# Patient Record
Sex: FEMALE | Race: WHITE | Employment: UNEMPLOYED | URBAN - METROPOLITAN AREA
[De-identification: names, ages, dates, MRNs, and addresses within clinical notes are randomized per-mention and may not be internally consistent; named-entity substitution may affect disease eponyms.]

---

## 2021-10-05 ENCOUNTER — HOSPITAL ENCOUNTER (INPATIENT)
Age: 51
LOS: 4 days | Discharge: HOME | DRG: 190 | End: 2021-10-09
Attending: EMERGENCY MEDICINE | Admitting: INTERNAL MEDICINE
Payer: MEDICARE

## 2021-10-05 ENCOUNTER — APPOINTMENT (OUTPATIENT)
Dept: CT IMAGING | Age: 51
DRG: 190 | End: 2021-10-05
Payer: MEDICARE

## 2021-10-05 DIAGNOSIS — R07.9 CHEST PAIN, UNSPECIFIED TYPE: Primary | ICD-10-CM

## 2021-10-05 PROBLEM — F41.8 DEPRESSION WITH ANXIETY: Status: ACTIVE | Noted: 2021-10-05

## 2021-10-05 PROBLEM — J44.1 COPD EXACERBATION (HCC): Status: ACTIVE | Noted: 2021-10-05

## 2021-10-05 PROBLEM — J96.21 ACUTE AND CHRONIC RESPIRATORY FAILURE WITH HYPOXIA (HCC): Status: ACTIVE | Noted: 2021-10-05

## 2021-10-05 PROBLEM — Z20.822 SUSPECTED COVID-19 VIRUS INFECTION: Status: ACTIVE | Noted: 2021-10-05

## 2021-10-05 LAB
A/G RATIO: 1.2 (ref 1.1–2.2)
ALBUMIN SERPL-MCNC: 3.9 G/DL (ref 3.4–5)
ALP BLD-CCNC: 91 U/L (ref 40–129)
ALT SERPL-CCNC: 20 U/L (ref 10–40)
ANION GAP SERPL CALCULATED.3IONS-SCNC: 12 MMOL/L (ref 3–16)
AST SERPL-CCNC: 19 U/L (ref 15–37)
BASOPHILS ABSOLUTE: 0.1 K/UL (ref 0–0.2)
BASOPHILS RELATIVE PERCENT: 0.8 %
BILIRUB SERPL-MCNC: 0.3 MG/DL (ref 0–1)
BUN BLDV-MCNC: 7 MG/DL (ref 7–20)
CALCIUM SERPL-MCNC: 9.3 MG/DL (ref 8.3–10.6)
CHLORIDE BLD-SCNC: 103 MMOL/L (ref 99–110)
CO2: 24 MMOL/L (ref 21–32)
CREAT SERPL-MCNC: <0.5 MG/DL (ref 0.6–1.1)
EKG ATRIAL RATE: 88 BPM
EKG DIAGNOSIS: NORMAL
EKG P AXIS: 61 DEGREES
EKG P-R INTERVAL: 164 MS
EKG Q-T INTERVAL: 342 MS
EKG QRS DURATION: 80 MS
EKG QTC CALCULATION (BAZETT): 413 MS
EKG R AXIS: 110 DEGREES
EKG T AXIS: 64 DEGREES
EKG VENTRICULAR RATE: 88 BPM
EOSINOPHILS ABSOLUTE: 0.2 K/UL (ref 0–0.6)
EOSINOPHILS RELATIVE PERCENT: 2 %
GFR AFRICAN AMERICAN: >60
GFR NON-AFRICAN AMERICAN: >60
GLOBULIN: 3.3 G/DL
GLUCOSE BLD-MCNC: 111 MG/DL (ref 70–99)
HCG QUALITATIVE: NEGATIVE
HCT VFR BLD CALC: 46.6 % (ref 36–48)
HEMOGLOBIN: 16.1 G/DL (ref 12–16)
LYMPHOCYTES ABSOLUTE: 2.8 K/UL (ref 1–5.1)
LYMPHOCYTES RELATIVE PERCENT: 23.4 %
MCH RBC QN AUTO: 31.1 PG (ref 26–34)
MCHC RBC AUTO-ENTMCNC: 34.5 G/DL (ref 31–36)
MCV RBC AUTO: 90.2 FL (ref 80–100)
MONOCYTES ABSOLUTE: 0.8 K/UL (ref 0–1.3)
MONOCYTES RELATIVE PERCENT: 6.3 %
NEUTROPHILS ABSOLUTE: 8 K/UL (ref 1.7–7.7)
NEUTROPHILS RELATIVE PERCENT: 67.5 %
PDW BLD-RTO: 14 % (ref 12.4–15.4)
PLATELET # BLD: 286 K/UL (ref 135–450)
PMV BLD AUTO: 7.3 FL (ref 5–10.5)
POTASSIUM REFLEX MAGNESIUM: 4.9 MMOL/L (ref 3.5–5.1)
PRO-BNP: 116 PG/ML (ref 0–124)
RBC # BLD: 5.16 M/UL (ref 4–5.2)
SODIUM BLD-SCNC: 139 MMOL/L (ref 136–145)
TOTAL PROTEIN: 7.2 G/DL (ref 6.4–8.2)
TROPONIN: <0.01 NG/ML
WBC # BLD: 11.9 K/UL (ref 4–11)

## 2021-10-05 PROCEDURE — 6370000000 HC RX 637 (ALT 250 FOR IP): Performed by: EMERGENCY MEDICINE

## 2021-10-05 PROCEDURE — U0005 INFEC AGEN DETEC AMPLI PROBE: HCPCS

## 2021-10-05 PROCEDURE — U0003 INFECTIOUS AGENT DETECTION BY NUCLEIC ACID (DNA OR RNA); SEVERE ACUTE RESPIRATORY SYNDROME CORONAVIRUS 2 (SARS-COV-2) (CORONAVIRUS DISEASE [COVID-19]), AMPLIFIED PROBE TECHNIQUE, MAKING USE OF HIGH THROUGHPUT TECHNOLOGIES AS DESCRIBED BY CMS-2020-01-R: HCPCS

## 2021-10-05 PROCEDURE — 71260 CT THORAX DX C+: CPT

## 2021-10-05 PROCEDURE — 85025 COMPLETE CBC W/AUTO DIFF WBC: CPT

## 2021-10-05 PROCEDURE — 83880 ASSAY OF NATRIURETIC PEPTIDE: CPT

## 2021-10-05 PROCEDURE — 80053 COMPREHEN METABOLIC PANEL: CPT

## 2021-10-05 PROCEDURE — 93005 ELECTROCARDIOGRAM TRACING: CPT | Performed by: EMERGENCY MEDICINE

## 2021-10-05 PROCEDURE — 84484 ASSAY OF TROPONIN QUANT: CPT

## 2021-10-05 PROCEDURE — 93010 ELECTROCARDIOGRAM REPORT: CPT | Performed by: INTERNAL MEDICINE

## 2021-10-05 PROCEDURE — 84703 CHORIONIC GONADOTROPIN ASSAY: CPT

## 2021-10-05 PROCEDURE — 6370000000 HC RX 637 (ALT 250 FOR IP): Performed by: INTERNAL MEDICINE

## 2021-10-05 PROCEDURE — 6360000004 HC RX CONTRAST MEDICATION: Performed by: EMERGENCY MEDICINE

## 2021-10-05 PROCEDURE — 6360000002 HC RX W HCPCS: Performed by: INTERNAL MEDICINE

## 2021-10-05 PROCEDURE — 94761 N-INVAS EAR/PLS OXIMETRY MLT: CPT

## 2021-10-05 PROCEDURE — 99284 EMERGENCY DEPT VISIT MOD MDM: CPT

## 2021-10-05 PROCEDURE — 1200000000 HC SEMI PRIVATE

## 2021-10-05 PROCEDURE — 2700000000 HC OXYGEN THERAPY PER DAY

## 2021-10-05 PROCEDURE — 2580000003 HC RX 258: Performed by: INTERNAL MEDICINE

## 2021-10-05 PROCEDURE — 36415 COLL VENOUS BLD VENIPUNCTURE: CPT

## 2021-10-05 PROCEDURE — 94640 AIRWAY INHALATION TREATMENT: CPT

## 2021-10-05 RX ORDER — IPRATROPIUM BROMIDE AND ALBUTEROL SULFATE 2.5; .5 MG/3ML; MG/3ML
1 SOLUTION RESPIRATORY (INHALATION)
Status: DISCONTINUED | OUTPATIENT
Start: 2021-10-06 | End: 2021-10-05

## 2021-10-05 RX ORDER — PREDNISONE 20 MG/1
40 TABLET ORAL DAILY
Status: DISCONTINUED | OUTPATIENT
Start: 2021-10-08 | End: 2021-10-08

## 2021-10-05 RX ORDER — SODIUM CHLORIDE 0.9 % (FLUSH) 0.9 %
5-40 SYRINGE (ML) INJECTION PRN
Status: DISCONTINUED | OUTPATIENT
Start: 2021-10-05 | End: 2021-10-09 | Stop reason: HOSPADM

## 2021-10-05 RX ORDER — QUETIAPINE FUMARATE 50 MG/1
50 TABLET, EXTENDED RELEASE ORAL NIGHTLY
COMMUNITY

## 2021-10-05 RX ORDER — SODIUM CHLORIDE 9 MG/ML
25 INJECTION, SOLUTION INTRAVENOUS PRN
Status: DISCONTINUED | OUTPATIENT
Start: 2021-10-05 | End: 2021-10-09 | Stop reason: HOSPADM

## 2021-10-05 RX ORDER — BUSPIRONE HYDROCHLORIDE 15 MG/1
15 TABLET ORAL 2 TIMES DAILY
COMMUNITY

## 2021-10-05 RX ORDER — GABAPENTIN 600 MG/1
600 TABLET ORAL 2 TIMES DAILY
COMMUNITY

## 2021-10-05 RX ORDER — METHYLPREDNISOLONE SODIUM SUCCINATE 40 MG/ML
40 INJECTION, POWDER, LYOPHILIZED, FOR SOLUTION INTRAMUSCULAR; INTRAVENOUS EVERY 6 HOURS
Status: COMPLETED | OUTPATIENT
Start: 2021-10-05 | End: 2021-10-07

## 2021-10-05 RX ORDER — DOXYCYCLINE HYCLATE 100 MG
100 TABLET ORAL EVERY 12 HOURS
Status: DISCONTINUED | OUTPATIENT
Start: 2021-10-05 | End: 2021-10-09 | Stop reason: HOSPADM

## 2021-10-05 RX ORDER — ALBUTEROL SULFATE 90 UG/1
2 AEROSOL, METERED RESPIRATORY (INHALATION)
Status: DISCONTINUED | OUTPATIENT
Start: 2021-10-06 | End: 2021-10-08

## 2021-10-05 RX ORDER — QUETIAPINE FUMARATE 50 MG/1
50 TABLET, EXTENDED RELEASE ORAL NIGHTLY
Status: DISCONTINUED | OUTPATIENT
Start: 2021-10-05 | End: 2021-10-09 | Stop reason: HOSPADM

## 2021-10-05 RX ORDER — ACETAMINOPHEN 325 MG/1
650 TABLET ORAL EVERY 6 HOURS PRN
Status: DISCONTINUED | OUTPATIENT
Start: 2021-10-05 | End: 2021-10-09 | Stop reason: HOSPADM

## 2021-10-05 RX ORDER — POLYETHYLENE GLYCOL 3350 17 G/17G
17 POWDER, FOR SOLUTION ORAL DAILY PRN
Status: DISCONTINUED | OUTPATIENT
Start: 2021-10-05 | End: 2021-10-09 | Stop reason: HOSPADM

## 2021-10-05 RX ORDER — NITROGLYCERIN 0.4 MG/1
0.4 TABLET SUBLINGUAL ONCE
Status: COMPLETED | OUTPATIENT
Start: 2021-10-05 | End: 2021-10-05

## 2021-10-05 RX ORDER — SODIUM CHLORIDE 0.9 % (FLUSH) 0.9 %
5-40 SYRINGE (ML) INJECTION EVERY 12 HOURS SCHEDULED
Status: DISCONTINUED | OUTPATIENT
Start: 2021-10-05 | End: 2021-10-09 | Stop reason: HOSPADM

## 2021-10-05 RX ORDER — ACETAMINOPHEN 650 MG/1
650 SUPPOSITORY RECTAL EVERY 6 HOURS PRN
Status: DISCONTINUED | OUTPATIENT
Start: 2021-10-05 | End: 2021-10-09 | Stop reason: HOSPADM

## 2021-10-05 RX ORDER — GABAPENTIN 300 MG/1
600 CAPSULE ORAL 2 TIMES DAILY
Status: DISCONTINUED | OUTPATIENT
Start: 2021-10-05 | End: 2021-10-09 | Stop reason: HOSPADM

## 2021-10-05 RX ORDER — ONDANSETRON 2 MG/ML
4 INJECTION INTRAMUSCULAR; INTRAVENOUS EVERY 6 HOURS PRN
Status: DISCONTINUED | OUTPATIENT
Start: 2021-10-05 | End: 2021-10-09 | Stop reason: HOSPADM

## 2021-10-05 RX ORDER — BUSPIRONE HYDROCHLORIDE 5 MG/1
15 TABLET ORAL 2 TIMES DAILY
Status: DISCONTINUED | OUTPATIENT
Start: 2021-10-06 | End: 2021-10-09 | Stop reason: HOSPADM

## 2021-10-05 RX ORDER — IPRATROPIUM BROMIDE AND ALBUTEROL SULFATE 2.5; .5 MG/3ML; MG/3ML
1 SOLUTION RESPIRATORY (INHALATION) ONCE
Status: COMPLETED | OUTPATIENT
Start: 2021-10-05 | End: 2021-10-05

## 2021-10-05 RX ORDER — ONDANSETRON 4 MG/1
4 TABLET, ORALLY DISINTEGRATING ORAL EVERY 8 HOURS PRN
Status: DISCONTINUED | OUTPATIENT
Start: 2021-10-05 | End: 2021-10-09 | Stop reason: HOSPADM

## 2021-10-05 RX ADMIN — IPRATROPIUM BROMIDE AND ALBUTEROL SULFATE 1 AMPULE: .5; 3 SOLUTION RESPIRATORY (INHALATION) at 15:21

## 2021-10-05 RX ADMIN — GABAPENTIN 600 MG: 300 CAPSULE ORAL at 22:11

## 2021-10-05 RX ADMIN — NITROGLYCERIN 0.4 MG: 0.4 TABLET SUBLINGUAL at 15:03

## 2021-10-05 RX ADMIN — ENOXAPARIN SODIUM 40 MG: 40 INJECTION SUBCUTANEOUS at 22:11

## 2021-10-05 RX ADMIN — IOPAMIDOL 75 ML: 755 INJECTION, SOLUTION INTRAVENOUS at 15:43

## 2021-10-05 RX ADMIN — SODIUM CHLORIDE, PRESERVATIVE FREE 10 ML: 5 INJECTION INTRAVENOUS at 22:11

## 2021-10-05 RX ADMIN — METHYLPREDNISOLONE SODIUM SUCCINATE 40 MG: 40 INJECTION, POWDER, FOR SOLUTION INTRAMUSCULAR; INTRAVENOUS at 22:11

## 2021-10-05 RX ADMIN — DOXYCYCLINE HYCLATE 100 MG: 100 TABLET, COATED ORAL at 22:11

## 2021-10-05 ASSESSMENT — PAIN SCALES - GENERAL: PAINLEVEL_OUTOF10: 0

## 2021-10-05 NOTE — H&P
Hospital Medicine History & Physical      PCP: [address]    Date of Admission: 10/5/2021    Date of Service: Pt seen/examined on 10/5/2021  and Admitted to Inpatient with expected LOS greater than two midnights due to medical therapy. Chief Complaint:    Chief Complaint   Patient presents with    Shortness of Breath     Came in by  EMS from home with c/o SOB and tightness to chest with 4 L O2. Hx COPD. History Of Present Illness: The patient is a 52 y.o. female with history of COPD, chronic respiratory failure on home oxygen, bipolar disorder, anxiety/depression who presented with few day history of chest tightness with associated dry cough, no fevers or chills. No chest pains. No diaphoresis. No diarrhea. No nausea or vomiting. She reports 3 pillow orthopnea but no PND. No ankle swelling. Chest CT angiogram negative for PE but noted for groundglass opacities questionable for viral pneumonitis. Patient vaccinated for COVID-19  Of note at home she is on 2 L O2 and had to be increased to 4 L nasal cannula to maintain sats greater than 90%      Past Medical History:        Diagnosis Date    Anxiety     Bipolar 1 disorder ([address]     Depression     Pulmonary emboli ([facility]        Past Surgical History:        Procedure Laterality Date    CHOLECYSTECTOMY      KNEE ARTHROSCOPY Right 6/24/14    WITH SYNOVECTOMY    KNEE SURGERY      right    MOUTH SURGERY      TUBAL LIGATION         Medications Prior to Admission:    Prior to Admission medications    Medication Sig Start Date End Date Taking? Authorizing Provider   gabapentin (NEURONTIN) 600 MG tablet Take 600 mg by mouth 2 times daily. Historical Provider, MD   busPIRone (BUSPAR) 15 MG tablet Take 15 mg by mouth 2 times daily    Historical Provider, MD   QUEtiapine (SEROQUEL XR) 50 MG extended release tablet Take 50 mg by mouth nightly    Historical Provider, MD       Allergies:  Aspirin    Social History:   The patient currently lives with family    TOBACCO:   reports that she has been smoking. She has a 12.50 pack-year smoking history. She has never used smokeless tobacco.  ETOH:   reports no history of alcohol use. Family History:  Reviewed in detail and negative for DM, Early CAD, Cancer, CVA. Positive as follows:        Problem Relation Age of Onset    Asthma Other     High Blood Pressure Other     Stroke Other        REVIEW OF SYSTEMS:   Positive for shortness of breath, dry cough, increased FiO2 use and as noted in the HPI. All other systems reviewed and negative. PHYSICAL EXAM:    /60   Pulse 79   Temp 98.8 °F (37.1 °C) (Oral)   Resp 23   Ht 5' 4\" (1.626 m)   Wt 256 lb (116.1 kg)   SpO2 91%   BMI 43.94 kg/m²     General appearance: No apparent distress appears stated age and cooperative. HEENT Normal cephalic, atraumatic without obvious deformity. Pupils equal, round, and reactive to light. Extra ocular muscles intact. Conjunctivae/corneas clear. Neck: Supple, No jugular venous distention/bruits. Lungs: Clear to auscultation, bilaterally without Rales/Wheezes/Rhonchi with good respiratory effort. Heart: Regular rate and rhythm with Normal S1/S2 without murmurs, rubs or gallops  Abdomen: Soft, non-tender or non-distended without rigidity or guarding and positive bowel sounds   Extremities: No clubbing, cyanosis, or edema bilaterally. Skin: Skin color, texture, turgor normal.  No rashes or lesions. Neurologic: Alert and oriented X 3, neurovascularly intact with sensory/motor intact upper extremities/lower extremities, bilaterally. Cranial nerves: II-XII intact, grossly non-focal.  Mental status: Alert, oriented, thought content appropriate.         CBC   Recent Labs     10/05/21  1415   WBC 11.9*   HGB 16.1*   HCT 46.6         RENAL  Recent Labs     10/05/21  1415      K 4.9      CO2 24   BUN 7   CREATININE <0.5*     LFT'S  Recent Labs     10/05/21  1415   AST 19 ALT 20   BILITOT 0.3   ALKPHOS 91     COAG  No results for input(s): INR in the last 72 hours. CARDIAC ENZYMES  Recent Labs     10/05/21  1415   TROPONINI <0.01       Active Hospital Problems    Diagnosis Date Noted    COPD exacerbation ([address] [J44.1] 10/05/2021    Acute and chronic respiratory failure with hypoxia [facility]) [J96.21] 10/05/2021    Depression with anxiety [F41.8] 10/05/2021         ASSESSMENT/PLAN:  52 y.o. female with history of COPD, chronic respiratory failure on home oxygen, bipolar disorder, anxiety/depression who presented with few day history of chest tightness with associated dry cough concerning for acute exacerbation of COPD and acute on chronic respiratory failure with hypoxia and hypercapnia, and suspected COVID-19 pneumonia    Plan:  -Continue on breathing therapy with scheduled and PRN bronchodilators and  cardiopulmonary protocol  -Systemic IV steroids  -Antibiotic coverage  - Oxygen supplementation with target saturations greater than 90% and wean as tolerated  -If not improving, will consider pulmonology consult  -Follow-up COVID-19 screening test  -Isolation until Covid screening test back    DVT Prophylaxis:  subcut enoxaparin  Diet: General diet  Code Status: Full code         Emmanuelle Gonzalez MD    Thank you [address] for the opportunity to be involved in this patient's care. If you have any questions or concerns please feel free to contact me at [phone].

## 2021-10-05 NOTE — ED PROVIDER NOTES
[address] PROVIDER NOTE    Patient Identification  Pt Name: Sally Mendoza  MRN: [id number]  Selvingfmeagan 1970  Date of evaluation: 10/5/2021  Provider: Bossman Rudolph MD  PCP: [facility] [address]     Chief Complaint  Shortness of Breath (Came in by  EMS from home with c/o SOB and tightness to chest with 4 L O2. Hx COPD. )      HPI  History provided by patient   This is a 52 y.o. female who presents to the ED for shortness of breath. Associated with chest tightness. Nonradiating. Unsure what is causing this. Does have cough for the past week or so. Nonproductive. No fevers or chills. No body aches. Tried using her inhaler for history of COPD without improvement. Does have history of nocturnal oxygen use but has not had oxygen available for the past few months. No recent long plane ride or car ride. Has been off of Coumadin for years. Has history of unprovoked pulmonary embolism. Last stress testing was roughly 3 years ago at [facility]. Did have history of coronary artery disease. [employer]  10 systems reviewed, pertinent positives/negatives per HPI otherwise noted to be negative. I have reviewed the following nursing documentation:  Allergies: Aspirin    Past medical history:   Past Medical History:   Diagnosis Date    Anxiety     Bipolar 1 disorder ([address]     Depression     Pulmonary emboli (HCC)      Past surgical history:   Past Surgical History:   Procedure Laterality Date    CHOLECYSTECTOMY      KNEE ARTHROSCOPY Right 6/24/14    WITH SYNOVECTOMY    KNEE SURGERY      right    MOUTH SURGERY      TUBAL LIGATION         Home medications:   Previous Medications    ALPRAZOLAM (XANAX) 1 MG TABLET    Take 1 mg by mouth three times daily. BUSPIRONE (BUSPAR) 15 MG TABLET    Take 15 mg by mouth 2 times daily    GABAPENTIN (NEURONTIN) 600 MG TABLET    Take 600 mg by mouth 3 times daily.     QUETIAPINE (SEROQUEL XR) 50 MG EXTENDED RELEASE TABLET Take 50 mg by mouth nightly    VENLAFAXINE (EFFEXOR XR) 75 MG XR CAPSULE    Take 75 mg by mouth daily. WARFARIN SODIUM (COUMADIN PO)    Take 8 mg by mouth daily. Social history:  reports that she has been smoking. She has a 12.50 pack-year smoking history. She has never used smokeless tobacco. She reports that she does not drink alcohol and does not use drugs. Family history:    Family History   Problem Relation Age of Onset    Asthma Other     High Blood Pressure Other     Stroke Other          Exam  ED Triage Vitals [10/05/21 1315]   BP Temp Temp Source Pulse Resp SpO2 Height Weight   135/74 98.8 °F (37.1 °C) Oral 92 20 90 % 5' 4\" (1.626 m) 256 lb (116.1 kg)     Nursing note and vitals reviewed. Constitutional: In no acute distress  HENT:      Head: Normocephalic      Ears: External ears normal.      Nose: Nose normal.     Mouth: Membrane mucosa moist   Eyes: No discharge. Neck: Supple. Trachea midline. Cardiovascular: Regular rate. Warm extremities  Pulmonary/Chest: Effort mildly increased. Bilateral wheezes, mild. No respiratory distress. Abdominal: Soft. No distension. Nontender  : Deferred  Rectal: Deferred   Musculoskeletal: Moves all extremities. No gross deformity. Neurological: Alert and oriented. Face symmetric. Speech is clear. Skin: Warm and dry. Psychiatric: Normal mood and affect. Behavior is normal.    Procedures      EKG  The Ekg interpreted by me in the absence of a cardiologist shows. Normal sinus rhythm. No specific ST changes appreciated. HR 88  No significant change from prior EKG dated 6/14        Radiology  CT CHEST PULMONARY EMBOLISM W CONTRAST    (Results Pending)       Labs  No results found for this visit on 10/05/21. Screenings           MDM and ED Course  This is a 52 y.o. female who presents to the ED for shortness of breath, chest tightness.   ACS in differential, but less likely since in 2018 had cardiac catheterization without obstructive

## 2021-10-05 NOTE — ED NOTES
Pharmacy Medication History Note      List of current medications patient is taking is complete. Source of information: [facility]    Changes made to medication list:  Medications flagged for removal (include reason, ex. noncompliance):  none    Medications removed (include reason, ex. therapy complete or physician discontinued):  See list below- therapy completed/non-compliance    Medications added/doses adjusted:  none    Other notes (ex. Recent course of antibiotics, Coumadin dosing):  Denies use of other OTC or herbal medications. Last dose times updated. Ellin Bamberger, PharmD  ED Pharmacist [id number]  10/5/2021    No current facility-administered medications on file prior to encounter. Current Outpatient Medications on File Prior to Encounter   Medication Sig Dispense Refill    gabapentin (NEURONTIN) 600 MG tablet Take 600 mg by mouth 2 times daily. busPIRone (BUSPAR) 15 MG tablet Take 15 mg by mouth 2 times daily      QUEtiapine (SEROQUEL XR) 50 MG extended release tablet Take 50 mg by mouth nightly      [DISCONTINUED] Warfarin Sodium (COUMADIN PO) Take 8 mg by mouth daily. [DISCONTINUED] omeprazole (PRILOSEC) 20 MG capsule Take 20 mg by mouth 2 times daily. [DISCONTINUED] ALPRAZolam (XANAX) 1 MG tablet Take 1 mg by mouth three times daily. [DISCONTINUED] oxyCODONE-acetaminophen (PERCOCET) 7.5-325 MG per tablet Take 1 tablet by mouth every 4 hours as needed for Pain. [DISCONTINUED] pregabalin (LYRICA) 150 MG capsule Take 150 mg by mouth 2 times daily. [DISCONTINUED] lamoTRIgine (LAMICTAL) 25 MG tablet Take 25 mg by mouth daily. [DISCONTINUED] topiramate (TOPAMAX) 50 MG tablet Take 50 mg by mouth 2 times daily. [DISCONTINUED] venlafaxine (EFFEXOR XR) 75 MG XR capsule Take 75 mg by mouth daily. [DISCONTINUED] Loratadine (CLARITIN) 10 MG CAPS Take  by mouth.       [DISCONTINUED] naproxen (NAPROSYN) 500 MG tablet Take 1 tablet by mouth 2 times daily (with meals). 60 tablet 3    [DISCONTINUED] risperiDONE (RISPERDAL M-TABS) 0.5 MG disintegrating tablet Take 0.5 mg by mouth 2 times daily.

## 2021-10-06 LAB
A/G RATIO: 1.1 (ref 1.1–2.2)
ALBUMIN SERPL-MCNC: 4.1 G/DL (ref 3.4–5)
ALP BLD-CCNC: 113 U/L (ref 40–129)
ALT SERPL-CCNC: 22 U/L (ref 10–40)
ANION GAP SERPL CALCULATED.3IONS-SCNC: 13 MMOL/L (ref 3–16)
AST SERPL-CCNC: 17 U/L (ref 15–37)
BILIRUB SERPL-MCNC: 0.4 MG/DL (ref 0–1)
BUN BLDV-MCNC: 11 MG/DL (ref 7–20)
CALCIUM SERPL-MCNC: 9.3 MG/DL (ref 8.3–10.6)
CHLORIDE BLD-SCNC: 101 MMOL/L (ref 99–110)
CO2: 23 MMOL/L (ref 21–32)
CREAT SERPL-MCNC: 0.5 MG/DL (ref 0.6–1.1)
GFR AFRICAN AMERICAN: >60
GFR NON-AFRICAN AMERICAN: >60
GLOBULIN: 3.7 G/DL
GLUCOSE BLD-MCNC: 184 MG/DL (ref 70–99)
HCT VFR BLD CALC: 50.6 % (ref 36–48)
HEMOGLOBIN: 17 G/DL (ref 12–16)
MCH RBC QN AUTO: 30.5 PG (ref 26–34)
MCHC RBC AUTO-ENTMCNC: 33.5 G/DL (ref 31–36)
MCV RBC AUTO: 91.1 FL (ref 80–100)
ORGANISM: ABNORMAL
PDW BLD-RTO: 14.4 % (ref 12.4–15.4)
PLATELET # BLD: 309 K/UL (ref 135–450)
PMV BLD AUTO: 7.9 FL (ref 5–10.5)
POTASSIUM REFLEX MAGNESIUM: 5.3 MMOL/L (ref 3.5–5.1)
PROCALCITONIN: 0.05 NG/ML (ref 0–0.15)
RBC # BLD: 5.56 M/UL (ref 4–5.2)
REPORT: NORMAL
RESPIRATORY PANEL PCR: ABNORMAL
SARS-COV-2: NOT DETECTED
SODIUM BLD-SCNC: 137 MMOL/L (ref 136–145)
TOTAL PROTEIN: 7.8 G/DL (ref 6.4–8.2)
WBC # BLD: 12.6 K/UL (ref 4–11)

## 2021-10-06 PROCEDURE — 85027 COMPLETE CBC AUTOMATED: CPT

## 2021-10-06 PROCEDURE — 6360000002 HC RX W HCPCS: Performed by: INTERNAL MEDICINE

## 2021-10-06 PROCEDURE — 2580000003 HC RX 258: Performed by: INTERNAL MEDICINE

## 2021-10-06 PROCEDURE — 94640 AIRWAY INHALATION TREATMENT: CPT

## 2021-10-06 PROCEDURE — 6370000000 HC RX 637 (ALT 250 FOR IP): Performed by: INTERNAL MEDICINE

## 2021-10-06 PROCEDURE — 0202U NFCT DS 22 TRGT SARS-COV-2: CPT

## 2021-10-06 PROCEDURE — 94761 N-INVAS EAR/PLS OXIMETRY MLT: CPT

## 2021-10-06 PROCEDURE — 99407 BEHAV CHNG SMOKING > 10 MIN: CPT | Performed by: INTERNAL MEDICINE

## 2021-10-06 PROCEDURE — 84145 PROCALCITONIN (PCT): CPT

## 2021-10-06 PROCEDURE — 2700000000 HC OXYGEN THERAPY PER DAY

## 2021-10-06 PROCEDURE — 99223 1ST HOSP IP/OBS HIGH 75: CPT | Performed by: INTERNAL MEDICINE

## 2021-10-06 PROCEDURE — 80053 COMPREHEN METABOLIC PANEL: CPT

## 2021-10-06 PROCEDURE — 1200000000 HC SEMI PRIVATE

## 2021-10-06 PROCEDURE — 36415 COLL VENOUS BLD VENIPUNCTURE: CPT

## 2021-10-06 RX ADMIN — Medication 2 PUFF: at 13:31

## 2021-10-06 RX ADMIN — Medication 2 PUFF: at 16:37

## 2021-10-06 RX ADMIN — Medication 2 PUFF: at 13:30

## 2021-10-06 RX ADMIN — QUETIAPINE FUMARATE 50 MG: 50 TABLET, EXTENDED RELEASE ORAL at 21:19

## 2021-10-06 RX ADMIN — BUSPIRONE HYDROCHLORIDE 15 MG: 5 TABLET ORAL at 16:16

## 2021-10-06 RX ADMIN — Medication 2 PUFF: at 09:14

## 2021-10-06 RX ADMIN — GABAPENTIN 600 MG: 300 CAPSULE ORAL at 21:13

## 2021-10-06 RX ADMIN — METHYLPREDNISOLONE SODIUM SUCCINATE 40 MG: 40 INJECTION, POWDER, FOR SOLUTION INTRAMUSCULAR; INTRAVENOUS at 04:17

## 2021-10-06 RX ADMIN — DOXYCYCLINE HYCLATE 100 MG: 100 TABLET, COATED ORAL at 09:53

## 2021-10-06 RX ADMIN — QUETIAPINE FUMARATE 50 MG: 50 TABLET, EXTENDED RELEASE ORAL at 00:01

## 2021-10-06 RX ADMIN — SODIUM CHLORIDE, PRESERVATIVE FREE 10 ML: 5 INJECTION INTRAVENOUS at 21:14

## 2021-10-06 RX ADMIN — METHYLPREDNISOLONE SODIUM SUCCINATE 40 MG: 40 INJECTION, POWDER, FOR SOLUTION INTRAMUSCULAR; INTRAVENOUS at 21:13

## 2021-10-06 RX ADMIN — SODIUM CHLORIDE, PRESERVATIVE FREE 10 ML: 5 INJECTION INTRAVENOUS at 09:56

## 2021-10-06 RX ADMIN — ENOXAPARIN SODIUM 40 MG: 40 INJECTION SUBCUTANEOUS at 09:52

## 2021-10-06 RX ADMIN — METHYLPREDNISOLONE SODIUM SUCCINATE 40 MG: 40 INJECTION, POWDER, FOR SOLUTION INTRAMUSCULAR; INTRAVENOUS at 16:16

## 2021-10-06 RX ADMIN — GABAPENTIN 600 MG: 300 CAPSULE ORAL at 09:53

## 2021-10-06 RX ADMIN — ENOXAPARIN SODIUM 40 MG: 40 INJECTION SUBCUTANEOUS at 21:13

## 2021-10-06 RX ADMIN — METHYLPREDNISOLONE SODIUM SUCCINATE 40 MG: 40 INJECTION, POWDER, FOR SOLUTION INTRAMUSCULAR; INTRAVENOUS at 09:52

## 2021-10-06 RX ADMIN — BUSPIRONE HYDROCHLORIDE 15 MG: 5 TABLET ORAL at 09:52

## 2021-10-06 RX ADMIN — ACETAMINOPHEN 650 MG: 325 TABLET ORAL at 10:45

## 2021-10-06 RX ADMIN — DOXYCYCLINE HYCLATE 100 MG: 100 TABLET, COATED ORAL at 21:13

## 2021-10-06 ASSESSMENT — PAIN DESCRIPTION - PAIN TYPE: TYPE: ACUTE PAIN

## 2021-10-06 ASSESSMENT — PAIN DESCRIPTION - DESCRIPTORS: DESCRIPTORS: ACHING

## 2021-10-06 ASSESSMENT — PAIN SCALES - GENERAL
PAINLEVEL_OUTOF10: 0
PAINLEVEL_OUTOF10: 0
PAINLEVEL_OUTOF10: 7
PAINLEVEL_OUTOF10: 0

## 2021-10-06 ASSESSMENT — PAIN DESCRIPTION - ORIENTATION: ORIENTATION: LOWER

## 2021-10-06 ASSESSMENT — PAIN DESCRIPTION - LOCATION: LOCATION: BACK

## 2021-10-06 NOTE — PROGRESS NOTES
Patient arrived to the unit in stable condition. SpO2 90% on 6LNC. Patient oriented to floor and use of call light. Call light within reach, no other needs expressed.

## 2021-10-06 NOTE — PROGRESS NOTES
[facility] HOSPITALISTS PROGRESS NOTE    10/6/2021 2:08 PM        Name: Denilson Stringer . Admitted: 10/5/2021  Primary Care Provider: Gauri HAGER Ami  (Tel: [phone])                        Subjective:  . No acute events overnight. Resting well. Pain control. Diet ok. Labs reviewed  Still with significant wheezing increased oxygen requirement to 10 L. Reviewed interval ancillary notes    Current Medications  busPIRone (BUSPAR) tablet 15 mg, BID  gabapentin (NEURONTIN) capsule 600 mg, BID  QUEtiapine (SEROQUEL XR) extended release tablet 50 mg, Nightly  sodium chloride flush 0.9 % injection 5-40 mL, 2 times per day  sodium chloride flush 0.9 % injection 5-40 mL, PRN  0.9 % sodium chloride infusion, PRN  ondansetron (ZOFRAN-ODT) disintegrating tablet 4 mg, Q8H PRN   Or  ondansetron (ZOFRAN) injection 4 mg, Q6H PRN  polyethylene glycol (GLYCOLAX) packet 17 g, Daily PRN  enoxaparin (LOVENOX) injection 40 mg, BID  acetaminophen (TYLENOL) tablet 650 mg, Q6H PRN   Or  acetaminophen (TYLENOL) suppository 650 mg, Q6H PRN  methylPREDNISolone sodium (SOLU-MEDROL) injection 40 mg, Q6H   Followed by  Abimael Crouch ON 10/8/2021] predniSONE (DELTASONE) tablet 40 mg, Daily  doxycycline hyclate (VIBRA-TABS) tablet 100 mg, Q12H  albuterol sulfate  (90 Base) MCG/ACT inhaler 2 puff, Q4H WA  ipratropium (ATROVENT HFA) 17 MCG/ACT inhaler 2 puff, Q4H WA        Objective:  /76   Pulse 86   Temp 98.1 °F (36.7 °C) (Oral)   Resp 18   Ht 5' 4\" (1.626 m)   Wt 263 lb 8 oz (119.5 kg)   SpO2 94%   BMI 45.23 kg/m²   No intake or output data in the 24 hours ending 10/06/21 1408   Wt Readings from Last 3 Encounters:   10/05/21 263 lb 8 oz (119.5 kg)   07/07/14 224 lb (101.6 kg)   06/24/14 234 lb 12.8 oz (106.5 kg)       General appearance:  Appears comfortable  Eyes: Sclera clear.  Pupils

## 2021-10-06 NOTE — PROGRESS NOTES
Physician Progress Note      Andrea Mae  [facility] #:                  [id number]  :                       1970  ADMIT DATE:       10/5/2021 1:07 PM  DISCH DATE:  RESPONDING  PROVIDER #:        Usha Ron MD          QUERY TEXT:    Patient admitted with Acute and chronic respiratory failure. If possible,   please document in progress notes and discharge summary if you are evaluating   and /or treating any of the following: The medical record reflects the following:  Risk Factors: BMI of 45.23  Clinical Indicators: BMI of 45.23  Treatment: Weights and monitoring    ASPEN Criteria:    https://aspenjournals. onlinelibrary. marino. com/doi/full/10.1177/949181467994175  5  Options provided:  -- Overweight with BMI 45.23  -- Other - I will add my own diagnosis  -- Disagree - Not applicable / Not valid  -- Disagree - Clinically unable to determine / Unknown  -- Refer to Clinical Documentation Reviewer    PROVIDER RESPONSE TEXT:    This patient is underweight with a BMI of 45.23.     Query created by: Kevin Fortune on 10/6/2021 8:37 AM      Electronically signed by:  Usha Ron MD 10/6/2021 2:08 PM

## 2021-10-06 NOTE — CONSULTS
PULMONARY AND CRITICAL CARE CONSULTATION NOTE    CONSULTING PHYSICIAN:      REASON FOR CONSULT:   Chief Complaint   Patient presents with    Shortness of Breath     Came in by [facility] from home with c/o SOB and tightness to chest with 4 L O2. Hx COPD. DATE OF CONSULT: 10/6/2021    HISTORY OF PRESENT ILLNESS: 52y.o. year old female with past medical history of morbid obesity, obstructive sleep apnea not on CPAP but on nocturnal oxygen, COPD, current everyday smoker, bipolar, anxiety/depression who presented to hospital with complaints of shortness of breath along with cough productive of whitish sputum and wheezing going on for the last 3 to 4 days. Patient is follows up with a pulmonologist at [facility]. She was diagnosed with obstructive sleep apnea but could not tolerate CPAP to return the machine. She is using oxygen only at nighttime. She does not use oxygen at daytime. Patient has a diagnosis of COPD although she has never had pulmonary function test performed before. She only use albuterol inhaler as needed. She only feels the need to use albuterol inhaler once or twice a month. Although she does state that she does get short of breath on doing heavy exertion. She states that she has slowed down compared to the peers of her age. No chest pain or hemoptysis. Patient underwent Covid testing which was negative. CT chest shows patchy infiltrates in the right upper lobe with mosaic attenuation and mild lymphadenopathy. Patient was requiring oxygen up to 6 L/min which increased to 10 L/min. When I saw the patient she was around 9 to 10 L oxygen and saturating 92%. She was wheezing but not in any respiratory distress. REVIEW OF SYSTEMS:   CONSTITUTIONAL SYMPTOMS: The patient denies fever, fatigue, night sweats, weight loss or weight gain. HEENT: No vision changes. No tinnitus, Denies sinus pain. No hoarseness, or dysphagia. NECK: Patient denies swelling in the neck. CARDIOVASCULAR: Denies chest pain, palpitation, syncope. RESPIRATORY: See above. GASTROINTESTINAL: Denies nausea, abdominal pain or change in bowel function. GENITOURINARY: Denies obstructive symptoms. No history of incontinence. BREASTS: No masses or lumps in the breasts. SKIN: No rashes or itching. MUSCULOSKELETAL: Denies weakness or bone pain. NEUROLOGICAL: No headaches or seizures. PSYCHIATRIC: Denies mood swings or depression. ENDOCRINE: Denies heat or cold intolerance or excessive thirst.  HEMATOLOGIC/LYMPHATIC: Denies easy bruising or lymph node swelling. ALLERGIC/IMMUNOLOGIC: No environmental allergies. PAST MEDICAL HISTORY:   Past Medical History:   Diagnosis Date    Anxiety     B[facility] 1 disorder ([facility] [address] [facility]     Depression     Pulmonary emboli (HCC)        PAST SURGICAL HISTORY:   Past Surgical History:   Procedure Laterality Date    CHOLECYSTECTOMY      KNEE ARTHROSCOPY Right 6/24/14    WITH SYNOVECTOMY    KNEE SURGERY      right    MOUTH SURGERY      TUBAL LIGATION         SOCIAL HISTORY:   Social History     Tobacco Use    Smoking status: Current Every Day Smoker     Packs/day: 0.50     Years: 25.00     Pack years: 12.50    Smokeless tobacco: Never Used   Substance Use Topics    Alcohol use: No    Drug use: No       FAMILY HISTORY:   Family History   Problem Relation Age of Onset    Asthma Other     High Blood Pressure Other     Stroke Other        MEDICATIONS:     No current facility-administered medications on file prior to encounter. Current Outpatient Medications on File Prior to Encounter   Medication Sig Dispense Refill    gabapentin (NEURONTIN) 600 MG tablet Take 600 mg by mouth 2 times daily.        busPIRone (BUSPAR) 15 MG tablet Take 15 mg by mouth 2 times daily      QUEtiapine (SEROQUEL XR) 50 MG extended release tablet Take 50 mg by mouth nightly          busPIRone  15 mg Oral BID    gabapentin  600 mg Oral BID    QUEtiapine  50 mg Oral Nightly    sodium chloride flush  5-40 mL IntraVENous 2 times per day    enoxaparin  40 mg SubCUTAneous BID    methylPREDNISolone  40 mg IntraVENous Q6H    Followed by   Verner [place] ON 10/8/2021] predniSONE  40 mg Oral Daily    doxycycline hyclate  100 mg Oral Q12H    albuterol sulfate HFA  2 puff Inhalation Q4H WA    ipratropium  2 puff Inhalation Q4H WA      sodium chloride       sodium chloride flush, sodium chloride, ondansetron **OR** ondansetron, polyethylene glycol, acetaminophen **OR** acetaminophen    ALLERGIES:   Allergies as of 10/05/2021 - Fully Reviewed 10/05/2021   Allergen Reaction Noted    Aspirin Hives 04/28/2013      OBJECTIVE:   height is 5' 4\" (1.626 m) and weight is 263 lb 8 oz (119.5 kg). Her oral temperature is 98.1 °F (36.7 °C). Her blood pressure is 118/76 and her pulse is 86. Her respiration is 18 and oxygen saturation is 94%. No intake/output data recorded. PHYSICAL EXAM:  CONSTITUTIONAL: She is a 52y.o.-year-old who appears her stated age. She is alert and oriented x 3 and in no acute distress. HEENT: PERRL. No scleral icterus. No thrush, atraumatic, normocephalic. NECK: Supple, without cervical or supraclavicular lymphadenopathy:  CARDIOVASCULAR: S1 S2 RRR. Without murmer  RESPIRATORY & CHEST: Bilateral expiratory wheezing, no crackles. Good air movement  GASTROINTESTINAL & ABDOMEN: Soft, nontender, positive bowel sounds in all quadrants, non-distended, without hepatosplenomegaly. GENITOURINARY: Deferred. MUSCULOSKELETAL: No tenderness to palpation of the axial skeleton. There is no clubbing. No cyanosis. No edema of the lower extremities. SKIN OF BODY: No rash or jaundice. PSYCHIATRIC EVALUATION: Normal affect. Patient answers questions appropriately. HEMATOLOGIC/LYMPHATIC/ IMMUNOLOGIC: No palpable lymphadenopathy. NEUROLOGIC: Alert and oriented x 3. Groslly non-focal. Motor strength is 5+/5 in all muscle groups. The patient has a normal sensorium globally.       LABS:  Lab Results   Component Value Date    WBC 12.6 (H) 10/06/2021    HGB 17.0 (H) 10/06/2021    HCT 50.6 (H) 10/06/2021     10/06/2021    ALT 22 10/06/2021    AST 17 10/06/2021     10/06/2021    K 5.3 (H) 10/06/2021     10/06/2021    CREATININE 0.5 (L) 10/06/2021    BUN 11 10/06/2021    CO2 23 10/06/2021       Lab Results   Component Value Date    GLUCOSE 184 (H) 10/06/2021    CALCIUM 9.3 10/06/2021     10/06/2021    K 5.3 (H) 10/06/2021    CO2 23 10/06/2021     10/06/2021    BUN 11 10/06/2021    CREATININE 0.5 (L) 10/06/2021       IMAGING:  I reviewed the CT chest from 10/5/2021 and my interpretation is as follows. Patchy infiltrates in the right upper lobe. Mosaic attenuation. Mild lymphadenopathy, likely reactive      IMPRESSION:   Acute hypoxic respiratory failure  Acute exacerbation of COPD, severity unknown  Obstructive sleep apnea, not on CPAP, on nocturnal oxygen  Morbid obesity  Current everyday smoker  Bipolar disorder  Anxiety/depression      RECOMMENDATION:   Patient has patchy infiltrates in the right upper lobe with mosaic attenuation bilaterally. Findings can be seen with atypical/viral pneumonia. COVID-19 PCR is negative. I will recheck respiratory molecular panel with COVID-19 PCR. Obtain procalcitonin. Patient is wheezing. Continue Solu-Medrol 40 mg IV every 6 for now. Continue albuterol and Atrovent nebs every 4 hours. If repeat Covid test is negative, then Pulmicort and Brovana nebs twice a day can be added and patient can be switched to duo nebs every 4 hours. Patient uses oxygen at night for obstructive sleep apnea. Does not use CPAP at night. She normally does not use oxygen during daytime. Currently requiring up to 9 to 10 L/min oxygen. Titrate FiO2 for saturation of 90 to 92%. Patient follows up with a pulmonologist at [facility].. She has never had PFTs. She will need outpatient PFTs.     Patient counselled on the dangers of tobacco use and urged to quit. Also discussed the importance of a supportive environment and helped identify them. Discussed possibility of various Nicotine replacement therapies if experiencing prolonged craving or withdrawal symptoms. Discussed the possibility of negative mood or depression after quitting. Reassured that some weight gain after smoking is common and dietary, exercise, or lifestyle changes will be able to control it. Time spent counseling was >10mins. Thank you for your consultation. We will continue to follow the patient. Bella Gaona MD  Pulmonary Critical Care and Sleep Medicine  10/6/2021, 2:40 PM    This note was completed using dragon medical speech recognition software. Grammatical errors, random word insertions, pronoun errors and incomplete sentences are occasional consequences of this technology due to software limitations. If there are questions or concerns about the content of this note of information contained within the body of this dictation they should be addressed with the provider for clarification.

## 2021-10-07 PROCEDURE — 94761 N-INVAS EAR/PLS OXIMETRY MLT: CPT

## 2021-10-07 PROCEDURE — 2580000003 HC RX 258: Performed by: INTERNAL MEDICINE

## 2021-10-07 PROCEDURE — 2700000000 HC OXYGEN THERAPY PER DAY

## 2021-10-07 PROCEDURE — 6370000000 HC RX 637 (ALT 250 FOR IP): Performed by: INTERNAL MEDICINE

## 2021-10-07 PROCEDURE — 1200000000 HC SEMI PRIVATE

## 2021-10-07 PROCEDURE — 94640 AIRWAY INHALATION TREATMENT: CPT

## 2021-10-07 PROCEDURE — 6360000002 HC RX W HCPCS: Performed by: INTERNAL MEDICINE

## 2021-10-07 RX ADMIN — SODIUM CHLORIDE, PRESERVATIVE FREE 10 ML: 5 INJECTION INTRAVENOUS at 08:44

## 2021-10-07 RX ADMIN — DOXYCYCLINE HYCLATE 100 MG: 100 TABLET, COATED ORAL at 08:42

## 2021-10-07 RX ADMIN — METHYLPREDNISOLONE SODIUM SUCCINATE 40 MG: 40 INJECTION, POWDER, FOR SOLUTION INTRAMUSCULAR; INTRAVENOUS at 08:42

## 2021-10-07 RX ADMIN — METHYLPREDNISOLONE SODIUM SUCCINATE 40 MG: 40 INJECTION, POWDER, FOR SOLUTION INTRAMUSCULAR; INTRAVENOUS at 16:33

## 2021-10-07 RX ADMIN — ENOXAPARIN SODIUM 40 MG: 40 INJECTION SUBCUTANEOUS at 21:42

## 2021-10-07 RX ADMIN — QUETIAPINE FUMARATE 50 MG: 50 TABLET, EXTENDED RELEASE ORAL at 21:41

## 2021-10-07 RX ADMIN — BUSPIRONE HYDROCHLORIDE 15 MG: 5 TABLET ORAL at 16:33

## 2021-10-07 RX ADMIN — Medication 2 PUFF: at 21:36

## 2021-10-07 RX ADMIN — Medication 2 PUFF: at 17:04

## 2021-10-07 RX ADMIN — BUSPIRONE HYDROCHLORIDE 15 MG: 5 TABLET ORAL at 08:43

## 2021-10-07 RX ADMIN — GABAPENTIN 600 MG: 300 CAPSULE ORAL at 21:42

## 2021-10-07 RX ADMIN — GABAPENTIN 600 MG: 300 CAPSULE ORAL at 08:43

## 2021-10-07 RX ADMIN — Medication 2 PUFF: at 13:08

## 2021-10-07 RX ADMIN — ENOXAPARIN SODIUM 40 MG: 40 INJECTION SUBCUTANEOUS at 08:43

## 2021-10-07 RX ADMIN — METHYLPREDNISOLONE SODIUM SUCCINATE 40 MG: 40 INJECTION, POWDER, FOR SOLUTION INTRAMUSCULAR; INTRAVENOUS at 04:49

## 2021-10-07 RX ADMIN — DOXYCYCLINE HYCLATE 100 MG: 100 TABLET, COATED ORAL at 21:42

## 2021-10-07 RX ADMIN — SODIUM CHLORIDE, PRESERVATIVE FREE 10 ML: 5 INJECTION INTRAVENOUS at 21:42

## 2021-10-07 RX ADMIN — Medication 2 PUFF: at 21:37

## 2021-10-07 RX ADMIN — Medication 2 PUFF: at 17:05

## 2021-10-07 ASSESSMENT — PAIN DESCRIPTION - ORIENTATION: ORIENTATION: LOWER

## 2021-10-07 ASSESSMENT — PAIN DESCRIPTION - PAIN TYPE: TYPE: ACUTE PAIN

## 2021-10-07 ASSESSMENT — PAIN SCALES - GENERAL
PAINLEVEL_OUTOF10: 0

## 2021-10-07 ASSESSMENT — PAIN DESCRIPTION - LOCATION: LOCATION: BACK

## 2021-10-07 NOTE — PROGRESS NOTES
Shift assessment completed. Routine vitals stable. SpO2 94% on 10L high flow nasal cannula. Scheduled medications given. Patient is awake, alert and oriented. Patient does not appear to be in distress. Call light within reach.

## 2021-10-07 NOTE — PROGRESS NOTES
[facility] HOSPITALISTS PROGRESS NOTE    10/7/2021 2:39 PM        Name: Christine Aguirre . Admitted: 10/5/2021  Primary Care Provider: Gauri HAGER [address]  (Tel: [phone])                        Subjective:  . No acute events overnight. Resting well. Pain control. Diet ok. Labs reviewed  Denies any chest pain sob.      Reviewed interval ancillary notes    Current Medications  busPIRone (BUSPAR) tablet 15 mg, BID  gabapentin (NEURONTIN) capsule 600 mg, BID  QUEtiapine (SEROQUEL XR) extended release tablet 50 mg, Nightly  sodium chloride flush 0.9 % injection 5-40 mL, 2 times per day  sodium chloride flush 0.9 % injection 5-40 mL, PRN  0.9 % sodium chloride infusion, PRN  ondansetron (ZOFRAN-ODT) disintegrating tablet 4 mg, Q8H PRN   Or  ondansetron (ZOFRAN) injection 4 mg, Q6H PRN  polyethylene glycol (GLYCOLAX) packet 17 g, Daily PRN  enoxaparin (LOVENOX) injection 40 mg, BID  acetaminophen (TYLENOL) tablet 650 mg, Q6H PRN   Or  acetaminophen (TYLENOL) suppository 650 mg, Q6H PRN  methylPREDNISolone sodium (SOLU-MEDROL) injection 40 mg, Q6H   Followed by  Mable Espinosa ON 10/8/2021] predniSONE (DELTASONE) tablet 40 mg, Daily  doxycycline hyclate (VIBRA-TABS) tablet 100 mg, Q12H  albuterol sulfate  (90 Base) MCG/ACT inhaler 2 puff, Q4H WA  ipratropium (ATROVENT HFA) 17 MCG/ACT inhaler 2 puff, Q4H WA        Objective:  /80   Pulse 76   Temp 98 °F (36.7 °C) (Oral)   Resp 18   Ht 5' 4\" (1.626 m)   Wt 263 lb 8 oz (119.5 kg)   SpO2 93%   BMI 45.23 kg/m²     Intake/Output Summary (Last 24 hours) at 10/7/2021 1439  Last data filed at 10/7/2021 0841  Gross per 24 hour   Intake 360 ml   Output --   Net 360 ml      Wt Readings from Last 3 Encounters:   10/05/21 263 lb 8 oz (119.5 kg)   07/07/14 224 lb (101.6 kg)   06/24/14 234 lb 12.8 oz (106.5 kg)       General appearance:  Appears comfortable  Eyes: Sclera clear. Pupils equal.  ENT: Moist oral mucosa. Trachea midline, no adenopathy. Cardiovascular: Regular rhythm, normal S1, S2. No murmur. No edema in lower extremities  Respiratory: Diffuse wheezing bilaterally  GI: Abdomen soft, no tenderness, not distended, normal bowel sounds  Musculoskeletal: No cyanosis in digits, neck supple  Neurology: CN 2-12 grossly intact. No speech or motor deficits  Psych: Normal affect. Alert and oriented in time, place and person  Skin: Warm, dry, normal turgor    Labs and Tests:  CBC:   Recent Labs     10/05/21  1415 10/06/21  0642   WBC 11.9* 12.6*   HGB 16.1* 17.0*    309     BMP:    Recent Labs     10/05/21  1415 10/06/21  0642    137   K 4.9 5.3*    101   CO2 24 23   BUN 7 11   CREATININE <0.5* 0.5*   GLUCOSE 111* 184*     Hepatic:   Recent Labs     10/05/21  1415 10/06/21  0642   AST 19 17   ALT 20 22   BILITOT 0.3 0.4   ALKPHOS 91 113       Discussed care with family and patient             Spent 30  minutes with patient and family at bedside and on unit reviewing medical records and labs, spent greater than 50% time counseling patient and family on diagnosis and plan   Problem List  Principal Problem:    COPD exacerbation ([address]  Active Problems:    Acute and chronic respiratory failure with hypoxia ([address]    Depression with anxiety    Suspected COVID-19 virus infection  Resolved Problems:    * No resolved hospital problems. *       Assessment & Plan:   Acute hypoxic respiratory failure  -Secondary to COPD exacerbation  -Patient PCR is positive for rhinovirus  To wean oxygen still on 8 L  -Appreciate pulmonary recommendation        Diet: ADULT DIET;  Regular  Code:Full Code  DVT PPX lovenox       Jackquelyn Holstein, MD   10/7/2021 2:39 PM

## 2021-10-08 PROCEDURE — 99233 SBSQ HOSP IP/OBS HIGH 50: CPT | Performed by: INTERNAL MEDICINE

## 2021-10-08 PROCEDURE — 6370000000 HC RX 637 (ALT 250 FOR IP): Performed by: HOSPITALIST

## 2021-10-08 PROCEDURE — 94761 N-INVAS EAR/PLS OXIMETRY MLT: CPT

## 2021-10-08 PROCEDURE — 1200000000 HC SEMI PRIVATE

## 2021-10-08 PROCEDURE — 6360000002 HC RX W HCPCS: Performed by: INTERNAL MEDICINE

## 2021-10-08 PROCEDURE — 6370000000 HC RX 637 (ALT 250 FOR IP): Performed by: INTERNAL MEDICINE

## 2021-10-08 PROCEDURE — 6360000002 HC RX W HCPCS: Performed by: HOSPITALIST

## 2021-10-08 PROCEDURE — 2580000003 HC RX 258: Performed by: INTERNAL MEDICINE

## 2021-10-08 PROCEDURE — 94640 AIRWAY INHALATION TREATMENT: CPT

## 2021-10-08 PROCEDURE — 2700000000 HC OXYGEN THERAPY PER DAY

## 2021-10-08 RX ORDER — IPRATROPIUM BROMIDE AND ALBUTEROL SULFATE 2.5; .5 MG/3ML; MG/3ML
1 SOLUTION RESPIRATORY (INHALATION)
Status: DISCONTINUED | OUTPATIENT
Start: 2021-10-08 | End: 2021-10-09 | Stop reason: HOSPADM

## 2021-10-08 RX ORDER — METHYLPREDNISOLONE SODIUM SUCCINATE 40 MG/ML
40 INJECTION, POWDER, LYOPHILIZED, FOR SOLUTION INTRAMUSCULAR; INTRAVENOUS EVERY 12 HOURS
Status: DISCONTINUED | OUTPATIENT
Start: 2021-10-08 | End: 2021-10-09 | Stop reason: HOSPADM

## 2021-10-08 RX ORDER — CODEINE PHOSPHATE AND GUAIFENESIN 10; 100 MG/5ML; MG/5ML
5 SOLUTION ORAL EVERY 4 HOURS PRN
Status: DISCONTINUED | OUTPATIENT
Start: 2021-10-08 | End: 2021-10-09 | Stop reason: HOSPADM

## 2021-10-08 RX ADMIN — BUSPIRONE HYDROCHLORIDE 15 MG: 5 TABLET ORAL at 16:42

## 2021-10-08 RX ADMIN — GABAPENTIN 600 MG: 300 CAPSULE ORAL at 10:03

## 2021-10-08 RX ADMIN — BUSPIRONE HYDROCHLORIDE 15 MG: 5 TABLET ORAL at 10:04

## 2021-10-08 RX ADMIN — QUETIAPINE FUMARATE 50 MG: 50 TABLET, EXTENDED RELEASE ORAL at 21:39

## 2021-10-08 RX ADMIN — DOXYCYCLINE HYCLATE 100 MG: 100 TABLET, COATED ORAL at 21:39

## 2021-10-08 RX ADMIN — IPRATROPIUM BROMIDE AND ALBUTEROL SULFATE 1 AMPULE: .5; 3 SOLUTION RESPIRATORY (INHALATION) at 16:43

## 2021-10-08 RX ADMIN — ENOXAPARIN SODIUM 40 MG: 40 INJECTION SUBCUTANEOUS at 21:40

## 2021-10-08 RX ADMIN — METHYLPREDNISOLONE SODIUM SUCCINATE 40 MG: 40 INJECTION, POWDER, FOR SOLUTION INTRAMUSCULAR; INTRAVENOUS at 21:39

## 2021-10-08 RX ADMIN — SODIUM CHLORIDE, PRESERVATIVE FREE 10 ML: 5 INJECTION INTRAVENOUS at 10:05

## 2021-10-08 RX ADMIN — ENOXAPARIN SODIUM 40 MG: 40 INJECTION SUBCUTANEOUS at 10:04

## 2021-10-08 RX ADMIN — IPRATROPIUM BROMIDE AND ALBUTEROL SULFATE 1 AMPULE: .5; 3 SOLUTION RESPIRATORY (INHALATION) at 20:14

## 2021-10-08 RX ADMIN — DOXYCYCLINE HYCLATE 100 MG: 100 TABLET, COATED ORAL at 10:03

## 2021-10-08 RX ADMIN — GABAPENTIN 600 MG: 300 CAPSULE ORAL at 21:39

## 2021-10-08 RX ADMIN — IPRATROPIUM BROMIDE AND ALBUTEROL SULFATE 1 AMPULE: .5; 3 SOLUTION RESPIRATORY (INHALATION) at 12:59

## 2021-10-08 RX ADMIN — METHYLPREDNISOLONE SODIUM SUCCINATE 40 MG: 40 INJECTION, POWDER, FOR SOLUTION INTRAMUSCULAR; INTRAVENOUS at 10:05

## 2021-10-08 RX ADMIN — IPRATROPIUM BROMIDE AND ALBUTEROL SULFATE 1 AMPULE: .5; 3 SOLUTION RESPIRATORY (INHALATION) at 09:08

## 2021-10-08 RX ADMIN — SODIUM CHLORIDE, PRESERVATIVE FREE 10 ML: 5 INJECTION INTRAVENOUS at 21:39

## 2021-10-08 ASSESSMENT — PAIN SCALES - GENERAL
PAINLEVEL_OUTOF10: 0
PAINLEVEL_OUTOF10: 0

## 2021-10-08 NOTE — PROGRESS NOTES
100 [facility] HOSPITALISTS PROGRESS NOTE    10/8/2021 9:17 AM        Name: Get Martínez . Admitted: 10/5/2021  Primary Care Provider: Gauri [address]  (Tel: [phone])                        Subjective:  . No acute events overnight. Resting well. Pain control. Diet ok. Labs reviewed  Denies any chest pain sob. Reviewed interval ancillary notes    Current Medications  ipratropium-albuterol (DUONEB) nebulizer solution 1 ampule, Q4H WA  guaiFENesin-codeine (GUAIFENESIN AC) 100-10 MG/5ML liquid 5 mL, Q4H PRN  methylPREDNISolone sodium (SOLU-MEDROL) injection 40 mg, Q12H  busPIRone (BUSPAR) tablet 15 mg, BID  gabapentin (NEURONTIN) capsule 600 mg, BID  QUEtiapine (SEROQUEL XR) extended release tablet 50 mg, Nightly  sodium chloride flush 0.9 % injection 5-40 mL, 2 times per day  sodium chloride flush 0.9 % injection 5-40 mL, PRN  0.9 % sodium chloride infusion, PRN  ondansetron (ZOFRAN-ODT) disintegrating tablet 4 mg, Q8H PRN   Or  ondansetron (ZOFRAN) injection 4 mg, Q6H PRN  polyethylene glycol (GLYCOLAX) packet 17 g, Daily PRN  enoxaparin (LOVENOX) injection 40 mg, BID  acetaminophen (TYLENOL) tablet 650 mg, Q6H PRN   Or  acetaminophen (TYLENOL) suppository 650 mg, Q6H PRN  doxycycline hyclate (VIBRA-TABS) tablet 100 mg, Q12H        Objective:  BP (!) 148/78   Pulse 77   Temp 97.9 °F (36.6 °C) (Oral)   Resp 18   Ht 5' 4\" (1.626 m)   Wt 263 lb 8 oz (119.5 kg)   SpO2 95%   BMI 45.23 kg/m²   No intake or output data in the 24 hours ending 10/08/21 0917   Wt Readings from Last 3 Encounters:   10/05/21 263 lb 8 oz (119.5 kg)   07/07/14 224 lb (101.6 kg)   06/24/14 234 lb 12.8 oz (106.5 kg)       General appearance:  Appears comfortable  Eyes: Sclera clear. Pupils equal.  ENT: Moist oral mucosa. Trachea midline, no adenopathy.   Cardiovascular: Regular rhythm, normal S1, S2. No murmur. No edema in lower extremities  Respiratory: Diffuse wheezing bilaterally  GI: Abdomen soft, no tenderness, not distended, normal bowel sounds  Musculoskeletal: No cyanosis in digits, neck supple  Neurology: CN 2-12 grossly intact. No speech or motor deficits  Psych: Normal affect. Alert and oriented in time, place and person  Skin: Warm, dry, normal turgor    Labs and Tests:  CBC:   Recent Labs     10/05/21  1415 10/06/21  0642   WBC 11.9* 12.6*   HGB 16.1* 17.0*    309     BMP:    Recent Labs     10/05/21  1415 10/06/21  0642    137   K 4.9 5.3*    101   CO2 24 23   BUN 7 11   CREATININE <0.5* 0.5*   GLUCOSE 111* 184*     Hepatic:   Recent Labs     10/05/21  1415 10/06/21  0642   AST 19 17   ALT 20 22   BILITOT 0.3 0.4   ALKPHOS 91 113       Discussed care with family and patient             Spent 30  minutes with patient and family at bedside and on unit reviewing medical records and labs, spent greater than 50% time counseling patient and family on diagnosis and plan   Problem List  Principal Problem:    COPD exacerbation ([facility]  Active Problems:    Acute and chronic respiratory failure with hypoxia ([facility]    Depression with anxiety    Suspected COVID-19 virus infection  Resolved Problems:    * No resolved hospital problems. *       Assessment & Plan:   Acute hypoxic respiratory failure  -Secondary to COPD exacerbation  -Patient PCR is positive for rhinovirus  Down to 2 L now  -Appreciate pulmonary recommendation  -Wean steroids down today. Plan for discharge tomorrow        Diet: ADULT DIET;  Regular  Code:Full Code  DVT PPX lovenox       Elyssa Clark MD   10/8/2021 9:17 AM

## 2021-10-08 NOTE — PROGRESS NOTES
Shift assessment completed. Routine vitals stable. SpO2 96% on 2LNC. Scheduled medications given. Patient is awake, alert and oriented. Respirations are easy and unlabored. Patient does not appear to be in distress. Call light within reach.

## 2021-10-08 NOTE — PROGRESS NOTES
PULMONARY AND CRITICAL CARE MEDICINE PROGRESS NOTE      SUBJECTIVE: Patient has clinically improved. Wheezing has improved. States improvement in shortness of breath and cough. REVIEW OF SYSTEMS: 8 point ROS is negative beside mentioned in [address] CONSTITUTIONAL SYMPTOMS: The patient denies fever, fatigue, night sweats, weight loss or weight gain. HEENT: No vision changes. No tinnitus, Denies sinus pain. No hoarseness, or dysphagia. CARDIOVASCULAR: Denies chest pain, palpitation, syncope. RESPIRATORY: See above. GASTROINTESTINAL: Denies nausea, abdominal pain or change in bowel function. SKIN: No rashes or itching. MUSCULOSKELETAL: Denies weakness or bone pain. NEUROLOGICAL: No headaches or seizures. MEDICATIONS:      ipratropium-albuterol  1 ampule Inhalation Q4H WA    methylPREDNISolone  40 mg IntraVENous Q12H    busPIRone  15 mg Oral BID    gabapentin  600 mg Oral BID    QUEtiapine  50 mg Oral Nightly    sodium chloride flush  5-40 mL IntraVENous 2 times per day    enoxaparin  40 mg SubCUTAneous BID    doxycycline hyclate  100 mg Oral Q12H      sodium chloride       guaiFENesin-codeine, sodium chloride flush, sodium chloride, ondansetron **OR** ondansetron, polyethylene glycol, acetaminophen **OR** acetaminophen     ALLERGIES:   Allergies as of 10/05/2021 - Fully Reviewed 10/05/2021   Allergen Reaction Noted    Aspirin Hives 04/28/2013        OBJECTIVE:   height is 5' 4\" (1.626 m) and weight is 263 lb 8 oz (119.5 kg). Her oral temperature is 98.2 °F (36.8 °C). Her blood pressure is 116/75 and her pulse is 64. Her respiration is 18 and oxygen saturation is 93%. I/O this shift:  In: 360 [P.O.:360]  Out: -      PHYSICAL EXAM:  CONSTITUTIONAL: She is a 52y.o.-year-old who appears her stated age. She is alert and oriented x 3 and in no acute distress. CARDIOVASCULAR: S1 S2 RRR. Without murmer  RESPIRATORY & CHEST: Lungs are clear to auscultation and percussion.  No wheezing, no crackles. Good air movement  GASTROINTESTINAL & ABDOMEN: Soft, nontender, positive bowel sounds in all quadrants, non-distended, without hepatosplenomegaly. GENITOURINARY: Deferred. MUSCULOSKELETAL: No tenderness to palpation of the axial skeleton. There is no clubbing. No cyanosis. No edema of the lower extremities. SKIN OF BODY: No rash or jaundice. PSYCHIATRIC EVALUATION: Normal affect. Patient answers questions appropriately. HEMATOLOGIC/LYMPHATIC/ IMMUNOLOGIC: No palpable lymphadenopathy. NEUROLOGIC: Alert and oriented x 3. Groslly non-focal. Motor strength is 5+/5 in all muscle groups. The patient has a normal sensorium globally. LABS:   Lab Results   Component Value Date    WBC 12.6 (H) 10/06/2021    HGB 17.0 (H) 10/06/2021    HCT 50.6 (H) 10/06/2021     10/06/2021    ALT 22 10/06/2021    AST 17 10/06/2021     10/06/2021    K 5.3 (H) 10/06/2021     10/06/2021    CREATININE 0.5 (L) 10/06/2021    BUN 11 10/06/2021    CO2 23 10/06/2021       Lab Results   Component Value Date    GLUCOSE 184 (H) 10/06/2021    CALCIUM 9.3 10/06/2021     10/06/2021    K 5.3 (H) 10/06/2021    CO2 23 10/06/2021     10/06/2021    BUN 11 10/06/2021    CREATININE 0.5 (L) 10/06/2021       Lab Results   Component Value Date    GLUCOSE 184 (H) 10/06/2021    CALCIUM 9.3 10/06/2021     10/06/2021    K 5.3 (H) 10/06/2021    CO2 23 10/06/2021     10/06/2021    BUN 11 10/06/2021    CREATININE 0.5 (L) 10/06/2021          IMAGING:  I reviewed the CT chest from 10/5/2021 and my interpretation is as follows. Patchy infiltrates in the right upper lobe. Mosaic attenuation.   Mild lymphadenopathy, likely reactive        IMPRESSION:   Acute hypoxic respiratory failure  Acute exacerbation of COPD, severity unknown  Obstructive sleep apnea, not on CPAP, on nocturnal oxygen  Morbid obesity  Current everyday smoker  Bipolar disorder  Anxiety/depression        RECOMMENDATION:   Patient has patchy infiltrates in the right upper lobe with mosaic attenuation bilaterally. Findings can be seen with atypical/viral pneumonia. COVID-19 PCR is negative. Respiratory molecular panel with COVID-19 PCR negative. Normal procalcitonin.     Wheezing has improved. Solu-Medrol can be changed to prednisone with a slow taper. Continue albuterol and Atrovent nebs every 4 hours.       Patient uses oxygen at night for obstructive sleep apnea. Does not use CPAP at night. She normally does not use oxygen during daytime. O2 has been weaned down to 2 L nasal cannula. Patient tells me that she needs to be reset for home oxygen as her home concentrator is broken. May need home O2 evaluation and  assistance.     Patient follows up with a pulmonologist at [address] [place] [address]. She has never had PFTs. She will need outpatient PFTs. Discharged on Symbicort 160/4.5 mcg 2 puffs twice daily and Spiriva Respimat 2.5 mcg 1 puff daily. Likely discharge tomorrow. Pulmonary will sign off. Taya Cisneros MD  Pulmonary Critical Care and Sleep Medicine  10/8/2021, 2:01 PM    This note was completed using dragon medical speech recognition software. Grammatical errors, random word insertions, pronoun errors and incomplete sentences are occasional consequences of this technology due to software limitations. If there are questions or concerns about the content of this note of information contained within the body of this dictation they should be addressed with the provider for clarification.

## 2021-10-08 NOTE — CARE COORDINATION
CM went and spoke to patient who states that she cannot remember the name of her oxygen company but \"they haven't returned my call in 2 years for equipment\" pt states oxygen concentrator has worked in 2 years. CM explained if she could find name of company d/t if she is active with someone our company [employer] can't assist.     Pt raises voice and states \" I'll just suffocate\" CM called Maria Fernanda Kaba with [employer] for assistance and he states pt has oxygen through [employer]. CM called [employer] [phone] and spoke to April who looked pt up and states her billing is good and she qualifies for the oxygen and just need an order and demographics faxed. She is aware pt may discharge tomorrow and will need oxygen delivered before leaving and states on call CM can be reached. Cm updated pt and updated address of hotel where pt is staying. Pt states will need uber d/t her and girlfriend do not have a car. PELON spoke to Dr Shelia Owen who states he will place DME order for concentrator and equipment. DME Order and demographics will need to be faxed to: [employer] at [phone].      Matthew Dunham RN, BSN  [phone]

## 2021-10-08 NOTE — CARE COORDINATION
This patient is active with [address] [place] [address] for home oxygen.    # [phone]  Fax - [phone]

## 2021-10-08 NOTE — CARE COORDINATION
CM faxed demographics and DME order to [address] at [phone].     Halle Anne RN, BSN  [phone]

## 2021-10-09 VITALS
RESPIRATION RATE: 18 BRPM | SYSTOLIC BLOOD PRESSURE: 106 MMHG | BODY MASS INDEX: 44.98 KG/M2 | DIASTOLIC BLOOD PRESSURE: 59 MMHG | OXYGEN SATURATION: 90 % | HEIGHT: 64 IN | HEART RATE: 74 BPM | WEIGHT: 263.5 LBS | TEMPERATURE: 97.9 F

## 2021-10-09 PROCEDURE — 2580000003 HC RX 258: Performed by: INTERNAL MEDICINE

## 2021-10-09 PROCEDURE — 6360000002 HC RX W HCPCS: Performed by: INTERNAL MEDICINE

## 2021-10-09 PROCEDURE — 94761 N-INVAS EAR/PLS OXIMETRY MLT: CPT

## 2021-10-09 PROCEDURE — 6370000000 HC RX 637 (ALT 250 FOR IP): Performed by: INTERNAL MEDICINE

## 2021-10-09 PROCEDURE — 6370000000 HC RX 637 (ALT 250 FOR IP): Performed by: HOSPITALIST

## 2021-10-09 PROCEDURE — 6360000002 HC RX W HCPCS: Performed by: HOSPITALIST

## 2021-10-09 PROCEDURE — 94640 AIRWAY INHALATION TREATMENT: CPT

## 2021-10-09 RX ORDER — PREDNISONE 10 MG/1
10 TABLET ORAL DAILY
Qty: 30 TABLET | Refills: 0 | Status: SHIPPED | OUTPATIENT
Start: 2021-10-09 | End: 2021-10-19

## 2021-10-09 RX ADMIN — DOXYCYCLINE HYCLATE 100 MG: 100 TABLET, COATED ORAL at 10:54

## 2021-10-09 RX ADMIN — ENOXAPARIN SODIUM 40 MG: 40 INJECTION SUBCUTANEOUS at 10:54

## 2021-10-09 RX ADMIN — GABAPENTIN 600 MG: 300 CAPSULE ORAL at 10:54

## 2021-10-09 RX ADMIN — SODIUM CHLORIDE, PRESERVATIVE FREE 10 ML: 5 INJECTION INTRAVENOUS at 10:55

## 2021-10-09 RX ADMIN — BUSPIRONE HYDROCHLORIDE 15 MG: 5 TABLET ORAL at 10:54

## 2021-10-09 RX ADMIN — METHYLPREDNISOLONE SODIUM SUCCINATE 40 MG: 40 INJECTION, POWDER, FOR SOLUTION INTRAMUSCULAR; INTRAVENOUS at 10:54

## 2021-10-09 RX ADMIN — IPRATROPIUM BROMIDE AND ALBUTEROL SULFATE 1 AMPULE: .5; 3 SOLUTION RESPIRATORY (INHALATION) at 13:42

## 2021-10-09 RX ADMIN — BUSPIRONE HYDROCHLORIDE 15 MG: 5 TABLET ORAL at 16:08

## 2021-10-09 NOTE — PLAN OF CARE
Problem: Falls - Risk of:  Goal: Will remain free from falls  Description: Will remain free from falls  Outcome: Ongoing  Goal: Absence of physical injury  Description: Absence of physical injury  Outcome: Ongoing     Problem: Falls - Risk of:  Goal: Will remain free from falls  Description: Will remain free from falls  Outcome: Ongoing  Goal: Absence of physical injury  Description: Absence of physical injury  Outcome: Ongoing     Problem: Breathing Pattern - Ineffective:  Goal: Ability to achieve and maintain a regular respiratory rate will improve  Description: Ability to achieve and maintain a regular respiratory rate will improve  Outcome: Ongoing     Problem: Pain:  Goal: Pain level will decrease  Description: Pain level will decrease  Outcome: Ongoing  Goal: Control of acute pain  Description: Control of acute pain  Outcome: Ongoing  Goal: Control of chronic pain  Description: Control of chronic pain  Outcome: Ongoing

## 2021-10-09 NOTE — PROGRESS NOTES
Pt 94% while sitting on rm air. While walking, 90%. Per Dr. Erasto Rosales, if pt's O2 Sats are ok on rm air, Pt is ok to discharge. Home O2 eval is not a condition for keeping pt if Pt's sat's are ok on rm air. Pt has been on rm air since 1:30 p.m. Went over discharge instructions with Pt. Answered all questions.

## 2021-10-09 NOTE — DISCHARGE SUMMARY
[facility] HOSPITALISTS DISCHARGE SUMMARY    Patient Demographics    Patient. Becky Fowler  Date of Birth. 1970  MRN. [id number]     Primary care provider. [address]  (Tel: [phone])    Admit date: 10/5/2021    Discharge date (blank if same as Note Date): Note Date: 10/9/2021     Reason for Hospitalization. Chief Complaint   Patient presents with    Shortness of Breath     Came in by [facility] from home with c/o SOB and tightness to chest with 4 L O2. Hx COPD. [facility]. COPD exacerbation secondary to rhinovirus  Patient admitted for acute sob secondary to COPD exab. Patient was treated with oxygen, duo nebs and iv steroid. Was weaned to baseline oxygen requirement and po steroids. discharged on po taper steroid. HDS at the time of discharge. N    Consults. IP CONSULT TO HOSPITALIST  IP CONSULT TO PULMONOLOGY    Physical examination on discharge day. BP (!) 106/59   Pulse 74   Temp 97.9 °F (36.6 °C) (Oral)   Resp 18   Ht 5' 4\" (1.626 m)   Wt 263 lb 8 oz (119.5 kg)   SpO2 94%   BMI 45.23 kg/m²   General appearance. Alert. Looks comfortable. HEENT. Sclera clear. Moist mucus membranes. Cardiovascular. Regular rate and rhythm, normal S1, S2. No murmur. Respiratory. Not using accessory muscles. Clear to auscultation bilaterally, no wheeze. Gastrointestinal. Abdomen soft, non-tender, not distended, normal bowel sounds  Neurology. Facial symmetry. No speech deficits. Moving all extremities equally. Extremities. No edema in lower extremities. Skin. Warm, dry, normal turgor    Condition at time of discharge stable     Medication instructions provided to patient at discharge.      Medication List      START taking these medications    predniSONE 10 MG tablet  Commonly known as: DELTASONE  Take 1 tablet by mouth daily for 10 days Prednisone taper:        Prednisone 10 mg 4 pills x 3 days -> 3 pills x 3 days -> 2 pills x 3 days -> 1 pill x 3 days        CONTINUE taking these medications    busPIRone 15 MG tablet  Commonly known as: BUSPAR     gabapentin 600 MG tablet  Commonly known as: NEURONTIN     QUEtiapine 50 MG extended release tablet  Commonly known as: SEROQUEL XR        STOP taking these medications    ALPRAZolam 1 MG tablet  Commonly known as: XANAX     Claritin 10 MG capsule  Generic drug: loratadine     COUMADIN PO     Effexor XR 75 MG extended release capsule  Generic drug: venlafaxine     omeprazole 20 MG delayed release capsule  Commonly known as: PRILOSEC     oxyCODONE-acetaminophen 7.5-325 MG per tablet  Commonly known as: PERCOCET     risperiDONE 0.5 MG disintegrating tablet  Commonly known as: RISPERDAL M-TABS           Where to Get Your Medications      You can get these medications from any pharmacy    Bring a paper prescription for each of these medications  · predniSONE 10 MG tablet         Discharge recommendations given to patient. Follow Up. pcp in 1 week   Disposition. home  Activity. activity as tolerated  Diet: ADULT DIET; Regular      Spent 45  minutes in discharge process.     Signed:  Oc Christian MD     10/9/2021 3:47 PM

## 2021-10-09 NOTE — PROGRESS NOTES
Patients head to toe assessment completed. Vital signs WNL. Bed alarm engaged, call light within reach. Scheduled medications given per MAR. Patient denies any pain at the moment. Patient up in a chair. Will continue to monitor.

## 2022-12-02 ENCOUNTER — HOSPITAL ENCOUNTER (INPATIENT)
Age: 52
LOS: 1 days | Discharge: LEFT BEFORE DISCHARGE | End: 2022-12-02
Attending: EMERGENCY MEDICINE | Admitting: INTERNAL MEDICINE
Payer: MEDICARE

## 2022-12-02 ENCOUNTER — APPOINTMENT (OUTPATIENT)
Dept: GENERAL RADIOLOGY | Age: 52
End: 2022-12-02
Payer: MEDICARE

## 2022-12-02 VITALS
WEIGHT: 235 LBS | OXYGEN SATURATION: 93 % | SYSTOLIC BLOOD PRESSURE: 139 MMHG | HEIGHT: 64 IN | BODY MASS INDEX: 40.12 KG/M2 | HEART RATE: 85 BPM | TEMPERATURE: 98.6 F | RESPIRATION RATE: 28 BRPM | DIASTOLIC BLOOD PRESSURE: 65 MMHG

## 2022-12-02 DIAGNOSIS — J96.01 ACUTE HYPOXEMIC RESPIRATORY FAILURE (HCC): ICD-10-CM

## 2022-12-02 DIAGNOSIS — J44.1 COPD WITH ACUTE EXACERBATION (HCC): ICD-10-CM

## 2022-12-02 DIAGNOSIS — J18.9 PNEUMONIA DUE TO INFECTIOUS ORGANISM, UNSPECIFIED LATERALITY, UNSPECIFIED PART OF LUNG: Primary | ICD-10-CM

## 2022-12-02 DIAGNOSIS — R65.20 SEVERE SEPSIS (HCC): ICD-10-CM

## 2022-12-02 DIAGNOSIS — A41.9 SEVERE SEPSIS (HCC): ICD-10-CM

## 2022-12-02 LAB
A/G RATIO: 1.2 (ref 1.1–2.2)
ALBUMIN SERPL-MCNC: 3.6 G/DL (ref 3.4–5)
ALP BLD-CCNC: 80 U/L (ref 40–129)
ALT SERPL-CCNC: 24 U/L (ref 10–40)
ANION GAP SERPL CALCULATED.3IONS-SCNC: 11 MMOL/L (ref 3–16)
AST SERPL-CCNC: 15 U/L (ref 15–37)
BASE EXCESS VENOUS: 0.6 MMOL/L (ref -3–3)
BASOPHILS ABSOLUTE: 0.1 K/UL (ref 0–0.2)
BASOPHILS RELATIVE PERCENT: 0.4 %
BILIRUB SERPL-MCNC: <0.2 MG/DL (ref 0–1)
BUN BLDV-MCNC: 8 MG/DL (ref 7–20)
CALCIUM SERPL-MCNC: 8.9 MG/DL (ref 8.3–10.6)
CARBOXYHEMOGLOBIN: 7.7 % (ref 0–1.5)
CHLORIDE BLD-SCNC: 105 MMOL/L (ref 99–110)
CO2: 26 MMOL/L (ref 21–32)
CREAT SERPL-MCNC: <0.5 MG/DL (ref 0.6–1.1)
EKG ATRIAL RATE: 81 BPM
EKG DIAGNOSIS: NORMAL
EKG P AXIS: 61 DEGREES
EKG P-R INTERVAL: 156 MS
EKG Q-T INTERVAL: 374 MS
EKG QRS DURATION: 86 MS
EKG QTC CALCULATION (BAZETT): 434 MS
EKG R AXIS: 97 DEGREES
EKG T AXIS: 57 DEGREES
EKG VENTRICULAR RATE: 81 BPM
EOSINOPHILS ABSOLUTE: 0.4 K/UL (ref 0–0.6)
EOSINOPHILS RELATIVE PERCENT: 3.3 %
GFR SERPL CREATININE-BSD FRML MDRD: >60 ML/MIN/{1.73_M2}
GLUCOSE BLD-MCNC: 207 MG/DL (ref 70–99)
GLUCOSE BLD-MCNC: 225 MG/DL (ref 70–99)
HCO3 VENOUS: 28.2 MMOL/L (ref 23–29)
HCT VFR BLD CALC: 45.9 % (ref 36–48)
HEMOGLOBIN, VEN, REDUCED: 8 %
HEMOGLOBIN: 15 G/DL (ref 12–16)
LACTIC ACID, SEPSIS: 2.5 MMOL/L (ref 0.4–1.9)
LACTIC ACID, SEPSIS: 3.4 MMOL/L (ref 0.4–1.9)
LYMPHOCYTES ABSOLUTE: 3.3 K/UL (ref 1–5.1)
LYMPHOCYTES RELATIVE PERCENT: 25 %
MCH RBC QN AUTO: 30.6 PG (ref 26–34)
MCHC RBC AUTO-ENTMCNC: 32.7 G/DL (ref 31–36)
MCV RBC AUTO: 93.5 FL (ref 80–100)
METHEMOGLOBIN VENOUS: 0.2 %
MONOCYTES ABSOLUTE: 0.6 K/UL (ref 0–1.3)
MONOCYTES RELATIVE PERCENT: 4.3 %
NEUTROPHILS ABSOLUTE: 8.8 K/UL (ref 1.7–7.7)
NEUTROPHILS RELATIVE PERCENT: 67 %
O2 CONTENT, VEN: 18 VOL %
O2 SAT, VEN: 91 %
O2 THERAPY: ABNORMAL
PCO2, VEN: 55.9 MMHG (ref 40–50)
PDW BLD-RTO: 14.5 % (ref 12.4–15.4)
PERFORMED ON: ABNORMAL
PH VENOUS: 7.31 (ref 7.35–7.45)
PLATELET # BLD: 321 K/UL (ref 135–450)
PMV BLD AUTO: 8 FL (ref 5–10.5)
PO2, VEN: 58.2 MMHG (ref 25–40)
POTASSIUM REFLEX MAGNESIUM: 4.1 MMOL/L (ref 3.5–5.1)
PRO-BNP: 63 PG/ML (ref 0–124)
PROCALCITONIN: 0.05 NG/ML (ref 0–0.15)
RAPID INFLUENZA  B AGN: NEGATIVE
RAPID INFLUENZA A AGN: NEGATIVE
RBC # BLD: 4.91 M/UL (ref 4–5.2)
SARS-COV-2, NAAT: NOT DETECTED
SODIUM BLD-SCNC: 142 MMOL/L (ref 136–145)
TCO2 CALC VENOUS: 67 MMOL/L
TOTAL PROTEIN: 6.7 G/DL (ref 6.4–8.2)
TROPONIN: <0.01 NG/ML
WBC # BLD: 13.2 K/UL (ref 4–11)

## 2022-12-02 PROCEDURE — 6360000002 HC RX W HCPCS: Performed by: EMERGENCY MEDICINE

## 2022-12-02 PROCEDURE — 96375 TX/PRO/DX INJ NEW DRUG ADDON: CPT

## 2022-12-02 PROCEDURE — 85025 COMPLETE CBC W/AUTO DIFF WBC: CPT

## 2022-12-02 PROCEDURE — 83880 ASSAY OF NATRIURETIC PEPTIDE: CPT

## 2022-12-02 PROCEDURE — 6370000000 HC RX 637 (ALT 250 FOR IP): Performed by: EMERGENCY MEDICINE

## 2022-12-02 PROCEDURE — 87631 RESP VIRUS 3-5 TARGETS: CPT

## 2022-12-02 PROCEDURE — 82803 BLOOD GASES ANY COMBINATION: CPT

## 2022-12-02 PROCEDURE — 80053 COMPREHEN METABOLIC PANEL: CPT

## 2022-12-02 PROCEDURE — 2580000003 HC RX 258: Performed by: EMERGENCY MEDICINE

## 2022-12-02 PROCEDURE — 94760 N-INVAS EAR/PLS OXIMETRY 1: CPT

## 2022-12-02 PROCEDURE — 87040 BLOOD CULTURE FOR BACTERIA: CPT

## 2022-12-02 PROCEDURE — 94640 AIRWAY INHALATION TREATMENT: CPT

## 2022-12-02 PROCEDURE — 84484 ASSAY OF TROPONIN QUANT: CPT

## 2022-12-02 PROCEDURE — 6370000000 HC RX 637 (ALT 250 FOR IP): Performed by: INTERNAL MEDICINE

## 2022-12-02 PROCEDURE — 83036 HEMOGLOBIN GLYCOSYLATED A1C: CPT

## 2022-12-02 PROCEDURE — 84145 PROCALCITONIN (PCT): CPT

## 2022-12-02 PROCEDURE — 87804 INFLUENZA ASSAY W/OPTIC: CPT

## 2022-12-02 PROCEDURE — 93010 ELECTROCARDIOGRAM REPORT: CPT | Performed by: INTERNAL MEDICINE

## 2022-12-02 PROCEDURE — 96367 TX/PROPH/DG ADDL SEQ IV INF: CPT

## 2022-12-02 PROCEDURE — 87635 SARS-COV-2 COVID-19 AMP PRB: CPT

## 2022-12-02 PROCEDURE — 2000000000 HC ICU R&B

## 2022-12-02 PROCEDURE — 71045 X-RAY EXAM CHEST 1 VIEW: CPT

## 2022-12-02 PROCEDURE — 83605 ASSAY OF LACTIC ACID: CPT

## 2022-12-02 PROCEDURE — 2700000000 HC OXYGEN THERAPY PER DAY

## 2022-12-02 PROCEDURE — 93005 ELECTROCARDIOGRAM TRACING: CPT | Performed by: EMERGENCY MEDICINE

## 2022-12-02 PROCEDURE — 6360000002 HC RX W HCPCS: Performed by: INTERNAL MEDICINE

## 2022-12-02 PROCEDURE — 96365 THER/PROPH/DIAG IV INF INIT: CPT

## 2022-12-02 PROCEDURE — 99285 EMERGENCY DEPT VISIT HI MDM: CPT

## 2022-12-02 RX ORDER — INSULIN LISPRO 100 [IU]/ML
0-4 INJECTION, SOLUTION INTRAVENOUS; SUBCUTANEOUS NIGHTLY
Status: DISCONTINUED | OUTPATIENT
Start: 2022-12-02 | End: 2022-12-02 | Stop reason: HOSPADM

## 2022-12-02 RX ORDER — SODIUM CHLORIDE 9 MG/ML
INJECTION, SOLUTION INTRAVENOUS PRN
Status: DISCONTINUED | OUTPATIENT
Start: 2022-12-02 | End: 2022-12-02 | Stop reason: HOSPADM

## 2022-12-02 RX ORDER — ACETAMINOPHEN 650 MG/1
650 SUPPOSITORY RECTAL EVERY 6 HOURS PRN
Status: DISCONTINUED | OUTPATIENT
Start: 2022-12-02 | End: 2022-12-02 | Stop reason: HOSPADM

## 2022-12-02 RX ORDER — ONDANSETRON 2 MG/ML
4 INJECTION INTRAMUSCULAR; INTRAVENOUS EVERY 6 HOURS PRN
Status: DISCONTINUED | OUTPATIENT
Start: 2022-12-02 | End: 2022-12-02 | Stop reason: HOSPADM

## 2022-12-02 RX ORDER — 0.9 % SODIUM CHLORIDE 0.9 %
30 INTRAVENOUS SOLUTION INTRAVENOUS ONCE
Status: COMPLETED | OUTPATIENT
Start: 2022-12-02 | End: 2022-12-02

## 2022-12-02 RX ORDER — IPRATROPIUM BROMIDE AND ALBUTEROL SULFATE 2.5; .5 MG/3ML; MG/3ML
1 SOLUTION RESPIRATORY (INHALATION) EVERY 4 HOURS
Status: DISCONTINUED | OUTPATIENT
Start: 2022-12-02 | End: 2022-12-02 | Stop reason: HOSPADM

## 2022-12-02 RX ORDER — LEVOFLOXACIN 5 MG/ML
750 INJECTION, SOLUTION INTRAVENOUS EVERY 24 HOURS
Status: DISCONTINUED | OUTPATIENT
Start: 2022-12-02 | End: 2022-12-02 | Stop reason: HOSPADM

## 2022-12-02 RX ORDER — ONDANSETRON 4 MG/1
4 TABLET, ORALLY DISINTEGRATING ORAL EVERY 8 HOURS PRN
Status: DISCONTINUED | OUTPATIENT
Start: 2022-12-02 | End: 2022-12-02 | Stop reason: HOSPADM

## 2022-12-02 RX ORDER — IPRATROPIUM BROMIDE AND ALBUTEROL SULFATE 2.5; .5 MG/3ML; MG/3ML
3 SOLUTION RESPIRATORY (INHALATION) ONCE
Status: COMPLETED | OUTPATIENT
Start: 2022-12-02 | End: 2022-12-02

## 2022-12-02 RX ORDER — GABAPENTIN 300 MG/1
600 CAPSULE ORAL 2 TIMES DAILY
Status: DISCONTINUED | OUTPATIENT
Start: 2022-12-02 | End: 2022-12-02 | Stop reason: HOSPADM

## 2022-12-02 RX ORDER — METHYLPREDNISOLONE SODIUM SUCCINATE 40 MG/ML
40 INJECTION, POWDER, LYOPHILIZED, FOR SOLUTION INTRAMUSCULAR; INTRAVENOUS DAILY
Status: DISCONTINUED | OUTPATIENT
Start: 2022-12-03 | End: 2022-12-02 | Stop reason: HOSPADM

## 2022-12-02 RX ORDER — ENOXAPARIN SODIUM 100 MG/ML
30 INJECTION SUBCUTANEOUS 2 TIMES DAILY
Status: DISCONTINUED | OUTPATIENT
Start: 2022-12-02 | End: 2022-12-02 | Stop reason: HOSPADM

## 2022-12-02 RX ORDER — INSULIN LISPRO 100 [IU]/ML
0-8 INJECTION, SOLUTION INTRAVENOUS; SUBCUTANEOUS
Status: DISCONTINUED | OUTPATIENT
Start: 2022-12-02 | End: 2022-12-02 | Stop reason: HOSPADM

## 2022-12-02 RX ORDER — QUETIAPINE FUMARATE 50 MG/1
50 TABLET, EXTENDED RELEASE ORAL NIGHTLY
Status: DISCONTINUED | OUTPATIENT
Start: 2022-12-02 | End: 2022-12-02 | Stop reason: HOSPADM

## 2022-12-02 RX ORDER — DEXTROSE MONOHYDRATE 100 MG/ML
INJECTION, SOLUTION INTRAVENOUS CONTINUOUS PRN
Status: DISCONTINUED | OUTPATIENT
Start: 2022-12-02 | End: 2022-12-02 | Stop reason: HOSPADM

## 2022-12-02 RX ORDER — ACETAMINOPHEN 325 MG/1
650 TABLET ORAL EVERY 6 HOURS PRN
Status: DISCONTINUED | OUTPATIENT
Start: 2022-12-02 | End: 2022-12-02 | Stop reason: HOSPADM

## 2022-12-02 RX ORDER — BUSPIRONE HYDROCHLORIDE 5 MG/1
15 TABLET ORAL 2 TIMES DAILY
Status: DISCONTINUED | OUTPATIENT
Start: 2022-12-02 | End: 2022-12-02 | Stop reason: HOSPADM

## 2022-12-02 RX ORDER — POLYETHYLENE GLYCOL 3350 17 G/17G
17 POWDER, FOR SOLUTION ORAL DAILY PRN
Status: DISCONTINUED | OUTPATIENT
Start: 2022-12-02 | End: 2022-12-02 | Stop reason: HOSPADM

## 2022-12-02 RX ORDER — SODIUM CHLORIDE 0.9 % (FLUSH) 0.9 %
5-40 SYRINGE (ML) INJECTION EVERY 12 HOURS SCHEDULED
Status: DISCONTINUED | OUTPATIENT
Start: 2022-12-02 | End: 2022-12-02 | Stop reason: HOSPADM

## 2022-12-02 RX ORDER — METHYLPREDNISOLONE SODIUM SUCCINATE 125 MG/2ML
125 INJECTION, POWDER, LYOPHILIZED, FOR SOLUTION INTRAMUSCULAR; INTRAVENOUS ONCE
Status: COMPLETED | OUTPATIENT
Start: 2022-12-02 | End: 2022-12-02

## 2022-12-02 RX ORDER — SODIUM CHLORIDE 0.9 % (FLUSH) 0.9 %
5-40 SYRINGE (ML) INJECTION PRN
Status: DISCONTINUED | OUTPATIENT
Start: 2022-12-02 | End: 2022-12-02 | Stop reason: HOSPADM

## 2022-12-02 RX ADMIN — INSULIN LISPRO 2 UNITS: 100 INJECTION, SOLUTION INTRAVENOUS; SUBCUTANEOUS at 12:05

## 2022-12-02 RX ADMIN — CEFTRIAXONE SODIUM 1000 MG: 1 INJECTION, POWDER, FOR SOLUTION INTRAMUSCULAR; INTRAVENOUS at 07:34

## 2022-12-02 RX ADMIN — SODIUM CHLORIDE 1641 ML: 9 INJECTION, SOLUTION INTRAVENOUS at 07:32

## 2022-12-02 RX ADMIN — LEVOFLOXACIN 750 MG: 5 INJECTION, SOLUTION INTRAVENOUS at 12:01

## 2022-12-02 RX ADMIN — AZITHROMYCIN MONOHYDRATE 500 MG: 500 INJECTION, POWDER, LYOPHILIZED, FOR SOLUTION INTRAVENOUS at 08:15

## 2022-12-02 RX ADMIN — IPRATROPIUM BROMIDE AND ALBUTEROL SULFATE 3 AMPULE: .5; 2.5 SOLUTION RESPIRATORY (INHALATION) at 07:36

## 2022-12-02 RX ADMIN — METHYLPREDNISOLONE SODIUM SUCCINATE 125 MG: 125 INJECTION, POWDER, FOR SOLUTION INTRAMUSCULAR; INTRAVENOUS at 07:28

## 2022-12-02 ASSESSMENT — PAIN DESCRIPTION - LOCATION: LOCATION: BACK

## 2022-12-02 ASSESSMENT — PAIN DESCRIPTION - ORIENTATION: ORIENTATION: MID;UPPER

## 2022-12-02 ASSESSMENT — PAIN DESCRIPTION - DESCRIPTORS: DESCRIPTORS: STABBING

## 2022-12-02 ASSESSMENT — PAIN SCALES - GENERAL: PAINLEVEL_OUTOF10: 6

## 2022-12-02 NOTE — ED PROVIDER NOTES
[facility] Emergency Department    Sara Khan MD, am the primary clinician of record. CHIEF COMPLAINT  Chief Complaint   Patient presents with    Shortness of Breath     Pt states she left AMA from a hospital in [place] after being admitted for pneumonia, pt states she is SOB and thinks she needs to be admitted here now       [address] is a 54 y.o. female  who presents to the ED complaining of hypoxia with shortness of breath. She was seen yesterday and also a few days ago and other hospital systems for similar symptoms but has continued to progress and worsen. Originally she was diagnosed with a COPD exacerbation on 27 November but left AGAINST MEDICAL ADVICE with hypoxia. Subsequently, she was seen yesterday and the intention was to admit her for concern for sepsis and pneumonia in addition to COPD with hypoxia, but she did not want to go to a [place] inpatient hospital from a smaller outlying hospital and so she left [address] again because she is willing to be admitted and wants to be taken care of at this facility. She has a history of coughing which is generally nonproductive but sometimes has some yellowish sputum. She has been wheezing as well. She does not have much chest pain except when coughing. She has a history of PE very remotely but is no longer anticoagulated and a few days ago had a CT scan that I reviewed in care everywhere at a different facility which was negative for this. She has had subjective fevers. She has no belly pain vomiting or diarrhea. No other complaints, modifying factors or associated symptoms. I have reviewed the following from the nursing documentation.     Past Medical History:   Diagnosis Date    Anxiety     Bipolar 1 disorder ([facility] [address]     Depression     Pulmonary emboli [facility])      Past Surgical History:   Procedure Laterality Date    CHOLECYSTECTOMY      KNEE ARTHROSCOPY Right 6/24/14 WITH SYNOVECTOMY    KNEE SURGERY      right    MOUTH SURGERY      TUBAL LIGATION       Family History   Problem Relation Age of Onset    Asthma Other     High Blood Pressure Other     Stroke Other      Social History     Socioeconomic History    Marital status:      Spouse name: Not on file    Number of children: 2    Years of education: Not on file    Highest education level: Not on file   Occupational History    Occupation: disabled   Tobacco Use    Smoking status: Every Day     Packs/day: 0.50     Years: 25.00     Pack years: 12.50     Types: Cigarettes    Smokeless tobacco: Never   Substance and Sexual Activity    Alcohol use: No    Drug use: No    Sexual activity: Not on file   Other Topics Concern    Not on file   Social History Narrative    Not on file     Social Determinants of Health     Financial Resource Strain: Not on file   Food Insecurity: Not on file   Transportation Needs: Not on file   Physical Activity: Not on file   Stress: Not on file   Social Connections: Not on file   Intimate Partner Violence: Not on file   Housing Stability: Not on file     Current Facility-Administered Medications   Medication Dose Route Frequency Provider Last Rate Last Admin    0.9 % sodium chloride bolus  30 mL/kg (Ideal) IntraVENous Once Brittany Venegas .7 mL/hr at 12/02/22 0732 1,641 mL at 12/02/22 0732    azithromycin (ZITHROMAX) 500 mg in dextrose 5 % 250 mL (vial-mate) IVPB  500 mg IntraVENous Once Brittany Venegas MD         Current Outpatient Medications   Medication Sig Dispense Refill    gabapentin (NEURONTIN) 600 MG tablet Take 600 mg by mouth 2 times daily. busPIRone (BUSPAR) 15 MG tablet Take 15 mg by mouth 2 times daily      QUEtiapine (SEROQUEL XR) 50 MG extended release tablet Take 50 mg by mouth nightly       Allergies   Allergen Reactions    Aspirin Hives       REVIEW OF SYSTEMS  10 systems reviewed, pertinent positives per HPI otherwise noted to be negative.     PHYSICAL EXAM  /63   Pulse 84   Temp 98.6 °F (37 °C) (Oral)   Resp 28   Ht 5' 4\" (1.626 m)   Wt 235 lb (106.6 kg)   SpO2 93%   BMI 40.34 kg/m²    GENERAL APPEARANCE: Awake and alert. Cooperative. Mild distress. HENT: Normocephalic. Atraumatic. Mucous membranes are dry. NECK: Supple. EYES: PERRL. EOM's grossly intact. HEART/CHEST: RRR. No murmurs. No chest wall tenderness. LUNGS: Respirations mildly labored on NC oxygen, rhonchi scattered at bases with diffuse wheezing and bronchospastic cough. ABDOMEN: No tenderness. Soft. Non-distended. No masses. No organomegaly. No guarding or rebound. Normal bowel sounds throughout. MUSCULOSKELETAL: No extremity edema. Compartments soft. No deformity. No tenderness in the extremities. All extremities neurovascularly intact. SKIN: Warm and dry. No acute rashes. NEUROLOGICAL: Alert and oriented. CN's 2-12 intact. No gross facial drooping. Strength 5/5, sensation intact. 2 plus DTR's in knees bilaterally. Gait normal.  PSYCHIATRIC: Normal mood and affect. LABS  I have reviewed all labs for this visit.    Results for orders placed or performed during the hospital encounter of 12/02/22   Rapid influenza A/B antigens    Specimen: Nasopharyngeal   Result Value Ref Range    Rapid Influenza A Ag Negative Negative    Rapid Influenza B Ag Negative Negative   COVID-19, Rapid    Specimen: Nasopharyngeal Swab   Result Value Ref Range    SARS-CoV-2, NAAT Not Detected Not Detected   CBC with Auto Differential   Result Value Ref Range    WBC 13.2 (H) 4.0 - 11.0 K/uL    RBC 4.91 4.00 - 5.20 M/uL    Hemoglobin 15.0 12.0 - 16.0 g/dL    Hematocrit 45.9 36.0 - 48.0 %    MCV 93.5 80.0 - 100.0 fL    MCH 30.6 26.0 - 34.0 pg    MCHC 32.7 31.0 - 36.0 g/dL    RDW 14.5 12.4 - 15.4 %    Platelets 422 618 - 231 K/uL    MPV 8.0 5.0 - 10.5 fL    Neutrophils % 67.0 %    Lymphocytes % 25.0 %    Monocytes % 4.3 %    Eosinophils % 3.3 %    Basophils % 0.4 %    Neutrophils Absolute 8.8 (H) 1.7 - 7.7 K/uL    Lymphocytes Absolute 3.3 1.0 - 5.1 K/uL    Monocytes Absolute 0.6 0.0 - 1.3 K/uL    Eosinophils Absolute 0.4 0.0 - 0.6 K/uL    Basophils Absolute 0.1 0.0 - 0.2 K/uL   Comprehensive Metabolic Panel w/ Reflex to MG   Result Value Ref Range    Sodium 142 136 - 145 mmol/L    Potassium reflex Magnesium 4.1 3.5 - 5.1 mmol/L    Chloride 105 99 - 110 mmol/L    CO2 26 21 - 32 mmol/L    Anion Gap 11 3 - 16    Glucose 207 (H) 70 - 99 mg/dL    BUN 8 7 - 20 mg/dL    Creatinine <0.5 (L) 0.6 - 1.1 mg/dL    Est, Glom Filt Rate >60 >60    Calcium 8.9 8.3 - 10.6 mg/dL    Total Protein 6.7 6.4 - 8.2 g/dL    Albumin 3.6 3.4 - 5.0 g/dL    Albumin/Globulin Ratio 1.2 1.1 - 2.2    Total Bilirubin <0.2 0.0 - 1.0 mg/dL    Alkaline Phosphatase 80 40 - 129 U/L    ALT 24 10 - 40 U/L    AST 15 15 - 37 U/L   Troponin   Result Value Ref Range    Troponin <0.01 <0.01 ng/mL   Brain Natriuretic Peptide   Result Value Ref Range    Pro-BNP 63 0 - 124 pg/mL   Lactate, Sepsis   Result Value Ref Range    Lactic Acid, Sepsis 3.4 (H) 0.4 - 1.9 mmol/L   Procalcitonin   Result Value Ref Range    Procalcitonin 0.05 0.00 - 0.15 ng/mL   Blood gas, venous   Result Value Ref Range    pH, Ag 7.310 (L) 7.350 - 7.450    pCO2, Ag 55.9 (H) 40.0 - 50.0 mmHg    pO2, Ag 58.2 (H) 25.0 - 40.0 mmHg    HCO3, Venous 28.2 23.0 - 29.0 mmol/L    Base Excess, Ag 0.6 -3.0 - 3.0 mmol/L    O2 Sat, Ag 91 Not Established %    Carboxyhemoglobin 7.7 (H) 0.0 - 1.5 %    MetHgb, Ag 0.2 <1.5 %    TC02 (Calc), Ag 67 Not Established mmol/L    O2 Content, Ag 18 Not Established VOL %    O2 Therapy Unknown     Hemoglobin, Ag, Reduced 8 %   EKG 12 Lead   Result Value Ref Range    Ventricular Rate 81 BPM    Atrial Rate 81 BPM    P-R Interval 156 ms    QRS Duration 86 ms    Q-T Interval 374 ms    QTc Calculation (Bazett) 434 ms    P Axis 61 degrees    R Axis 97 degrees    T Axis 57 degrees    Diagnosis Normal sinus rhythmRightward axisBorderline ECG      The 12 lead EKG was interpreted by me independent of a cardiologist as follows:  Rate: normal with a rate of 81  Rhythm: sinus  Axis: right deviation  Intervals: normal RI, narrow QRS, normal QTc  ST segments: no ST elevations or depressions  T waves: no abnormal inversions  Non-specific T wave changes: not present  Prior EKG comparison: EKG dated 10/5/21 is not significantly different      RADIOLOGY    XR CHEST PORTABLE    Result Date: 12/2/2022  EXAMINATION: ONE XRAY VIEW OF THE CHEST 12/2/2022 6:52 am COMPARISON: 10/05/2021 HISTORY: ORDERING SYSTEM PROVIDED HISTORY: concern for pna TECHNOLOGIST PROVIDED HISTORY: Reason for exam:->concern for pna Reason for Exam: concern for pna FINDINGS: Bilateral perihilar interstitial and alveolar opacities. Streaky bilateral lower lung zone airspace opacities. No large effusion or pneumothorax. The cardiac silhouette and mediastinal contours are stable. No acute bony abnormality. Bilateral perihilar interstitial and alveolar opacities which may be related to edema or atypical/viral infection. Streaky bilateral lower lung zone airspace opacities are favored to reflect atelectasis. An infectious process could also have this appearance. ED COURSE/MDM  Patient seen and evaluated. Old records reviewed. Labs and imaging reviewed and results discussed with patient. After initial evaluation, differential diagnostic considerations included: acute coronary syndrome, pulmonary embolism, COPD/asthma, pneumonia, sepsis, pericardial tamponade, pneumothorax, CHF, thoracic aortic dissection, anxiety    The patient's ED workup was notable for concern for criteria for severe sepsis with pneumonia, community-acquired, with negative flu and COVID despite a low procalcitonin. Lactate is elevated. The patient has multifactorial hypoxic respiratory failure but stable on nasal cannula oxygen, likely concomitant COPD as well as her pneumonia.   She does not appear to have congestive heart failure with a BNP less than 100 and no significant peripheral edema. She is agreeable to admission here. She does have a leukocytosis which also fits with a septic profile despite a normal temperature. She was given Rocephin and azithromycin, she had a negative PE study a few days ago with low clinical suspicion for PE now so I do not feel that this needs to be repeated. Patient was also given breathing treatments and protocol fluids and steroids. Is this patient to be included in the SEP-1 Core Measure? Yes   SEP-1 CORE MEASURE DATA      Sepsis Criteria   Severe Sepsis Criteria   Septic Shock Criteria     Must be confirmed or suspected to move forward with diagnosis of sepsis. Must meet 2:    [] Temperature > 100.9 F (38.3 C)        or < 96.8 F (36 C)  [] HR > 90  [x] RR > 20  [x] WBC > 12 or < 4 or 10% bands      AND:      [x] Infection Confirmed or        Suspected. Must meet 1:    [x] Lactate > 2       or   [] Signs of Organ Dysfunction:    - SBP < 90 or MAP < 65  - Altered mental status  - Creatinine > 2 or increased from      baseline  - Urine Output < 0.5 ml/kg/hr  - Bilirubin > 2  - INR > 1.5 (not anticoagulated)  - Platelets < 661,530  - Acute Respiratory Failure as     evidenced by new need for NIPPV     or mechanical ventilation      [] No criteria met for Severe Sepsis. Must meet 1:    [] Lactate > 4        or   [] SBP < 90 or MAP < 65 for at        least two readings in the first        hour after fluid bolus        administration      [] Vasopressors initiated (if hypotension persists after fluid resuscitation)        [x] No criteria met for Septic Shock.    Patient Vitals for the past 6 hrs:   BP Temp Pulse Resp SpO2 Height Weight Percent Weight Change   12/02/22 0603 139/70 98.6 °F (37 °C) 79 24 93 % 5' 4\" (1.626 m) 235 lb (106.6 kg) 0   12/02/22 0634 -- -- 78 20 97 % -- -- --   12/02/22 0734 (!) 125/57 98.6 °F (37 °C) 83 (!) 32 95 % -- -- --   12/02/22 0737 -- -- 85 27 94 % -- -- -- 12/02/22 0800 119/63 -- 84 28 93 % -- -- --      Recent Labs     12/02/22 0657   WBC 13.2*   CREATININE <0.5*   BILITOT <0.2            Time Severe Sepsis Identified: 0645    Fluid Resuscitation Rational: at least 30mL/kg based on ideal body weight due to obesity defined as BMI >30 (patient's BMI is Body mass index is 40.34 kg/m². and IBW is Ideal body weight: 54.7 kg (120 lb 9.5 oz)Adjusted ideal body weight: 75.5 kg (166 lb 5.7 oz))      Repeat lactate level: ordered and pending at this time    Reassessment Exam:   Not applicable. Patient does not have septic shock. During the patient's ED course, the patient was given:  Medications   0.9 % sodium chloride bolus (1,641 mLs IntraVENous New Bag 12/2/22 0732)   azithromycin (ZITHROMAX) 500 mg in dextrose 5 % 250 mL (vial-mate) IVPB (has no administration in time range)   ipratropium-albuterol (DUONEB) nebulizer solution 3 ampule (3 ampules Inhalation Given 12/2/22 0736)   methylPREDNISolone sodium (SOLU-MEDROL) injection 125 mg (125 mg IntraVENous Given 12/2/22 0728)   cefTRIAXone (ROCEPHIN) 1,000 mg in dextrose 5 % 50 mL IVPB mini-bag (0 mg IntraVENous Stopped 12/2/22 0805)        CLINICAL IMPRESSION  1. Pneumonia due to infectious organism, unspecified laterality, unspecified part of lung    2. Severe sepsis ([address]    3. Acute hypoxemic respiratory failure (HCC)    4. COPD with acute exacerbation (HCC)        Blood pressure 119/63, pulse 84, temperature 98.6 °F (37 °C), temperature source Oral, resp. rate 28, height 5' 4\" (1.626 m), weight 235 lb (106.6 kg), SpO2 93 %. [address] was admitted in fair condition. The plan is to admit to the hospital at this time under the hospitalist service. Hospitalist accepted the patient and will take over the patient's care. The total critical care time I independently spent while evaluating and treating this patient was 40 minutes.   This excludes time spent doing separately billable procedures. This includes time at the bedside, data interpretation, medication management, obtaining critical history from collateral sources if the patient is unable to provide it directly, and physician consultation. Specifics of interventions taken and potentially life-threatening diagnostic considerations are listed above in the medical decision making. If this was a shared visit with an MICKY, the time in this attestation is non-concurrent critical care time out of the total shared critical care time provided by the MICKY and myself. DISCLAIMER: This chart was created using Dragon dictation software. Efforts were made by me to ensure accuracy, however some errors may be present due to limitations of this technology and occasionally words are not transcribed correctly.         Sukumar Mendez MD  12/02/22 4820

## 2022-12-02 NOTE — H&P
HOSPITALISTS HISTORY AND PHYSICAL    12/2/2022 11:15 AM    Patient Information:  Hailey Lewis is a 54 y.o. female [id number]  PCP:  [address] (Tel: [phone] )    Chief complaint:    Chief Complaint   Patient presents with    Shortness of Breath     Pt states she left AMA from a hospital in [place] after being admitted for pneumonia, pt states she is SOB and thinks she needs to be admitted here now        History of Present Illness:  Alana Crowell is a 54 y.o. female who has been having shortness of breath for the last 2 weeks associate with cough productive of clear sputum no fevers no chills slight runny nose and some nausea denies any sick contacts. Patient continues to smoke 1.5 ppd no etoh use, hx of copd   Dnies sick contacts          REVIEW OF SYSTEMS:   Constitutional: Negative for fever,chills or night sweats  ENT: Negative for rhinorrhea, epistaxis, hoarseness, sore throat. Respiratory: see above  Cardiovascular: Negative for chest pain, palpitations   Gastrointestinal: see above  Genitourinary: Negative for polyuria, dysuria   Hematologic/Lymphatic: Negative for bleeding tendency, easy bruising  Musculoskeletal: Negative for myalgias and arthralgias  Neurologic: Negative for confusion,dysarthria. Skin: Negative for itching,rash  Psychiatric: Negative for depression,anxiety, agitation. Endocrine: Negative for polydipsia,polyuria,heat /cold intolerance. Past Medical History:   has a past medical history of Anxiety, Bipolar 1 disorder ([address] Depression, and Pulmonary emboli ([address] Past Surgical History:   has a past surgical history that includes Cholecystectomy; Tubal ligation; knee surgery; Mouth surgery; and Knee arthroscopy (Right, 6/24/14). Medications:  No current facility-administered medications on file prior to encounter.      Current Outpatient Medications on File Prior to Encounter   Medication Sig Dispense Refill    gabapentin (NEURONTIN) 600 MG tablet Take 600 mg by mouth 2 times daily. busPIRone (BUSPAR) 15 MG tablet Take 15 mg by mouth 2 times daily      QUEtiapine (SEROQUEL XR) 50 MG extended release tablet Take 50 mg by mouth nightly         Allergies: Allergies   Allergen Reactions    Chantix [Varenicline]     Aspirin Hives        Social History:  Patient Lives at home   reports that she has been smoking. She has a 12.50 pack-year smoking history. She has never used smokeless tobacco. She reports that she does not drink alcohol and does not use drugs. Family History:  family history includes Asthma in an other family member; High Blood Pressure in an other family member; Stroke in an other family member. ,     Physical Exam:  /65   Pulse 85   Temp 98.6 °F (37 °C) (Oral)   Resp 28   Ht 5' 4\" (1.626 m)   Wt 235 lb (106.6 kg)   SpO2 93%   BMI 40.34 kg/m²     General appearance:  Appears comfortable. AAOx3  HEENT: atraumatic, Pupils equal, muscous membranes moist, no masses appreciated  Cardiovascular: Regular rate and rhythm no murmurs appreciated  Respiratory: moderate expiratory wehezing  Gastrointestinal: Abdomen soft, non-tender, BS+  EXT: no edema  Neurology: no gross focal deficts  Psychiatry: Appropriate affect.  Not agitated  Skin: Warm, dry, no rashes appreciated    Labs:  CBC:   Lab Results   Component Value Date/Time    WBC 13.2 12/02/2022 06:57 AM    RBC 4.91 12/02/2022 06:57 AM    HGB 15.0 12/02/2022 06:57 AM    HCT 45.9 12/02/2022 06:57 AM    MCV 93.5 12/02/2022 06:57 AM    MCH 30.6 12/02/2022 06:57 AM    MCHC 32.7 12/02/2022 06:57 AM    RDW 14.5 12/02/2022 06:57 AM     12/02/2022 06:57 AM    MPV 8.0 12/02/2022 06:57 AM     BMP:    Lab Results   Component Value Date/Time     12/02/2022 06:57 AM    K 4.1 12/02/2022 06:57 AM     12/02/2022 06:57 AM    CO2 26 12/02/2022 06:57 AM    BUN 8 12/02/2022 06:57 AM    CREATININE <0.5 12/02/2022 06:57 AM    CALCIUM 8.9 12/02/2022 06:57 AM    GFRAA >60 10/06/2021 06:42 AM    LABGLOM >60 12/02/2022 06:57 AM    GLUCOSE 207 12/02/2022 06:57 AM     XR CHEST PORTABLE   Final Result   Bilateral perihilar interstitial and alveolar opacities which may be related   to edema or atypical/viral infection. Streaky bilateral lower lung zone airspace opacities are favored to reflect   atelectasis. An infectious process could also have this appearance. Recent imaging reviewed    Problem List  Principal Problem:    Acute hypoxemic respiratory failure ([address]  Resolved Problems:    * No resolved hospital problems. *        Assessment/Plan:   acute hypoxic respiratory failure secondary to COPD exacerbation secondary to tobacco abuse and possible pneumonia   - wean o2 as able  - check viral panel  =- iv steroids  Duonebs  - iv atbx    Hyperglycemia check a1c   ssi      DVT prophylaxis lovenox  Code status full code        Admit as inpatient I anticipate hospitalization spanning more than two midnights for investigation and treatment of the above medically necessary diagnoses. Please note that some part of this chart was generated using Dragon dictation software. Although every effort was made to ensure the accuracy of this automated transcription, some errors in transcription may have occurred inadvertently. If you may need any clarification, please do not hesitate to contact me through [facility].        Samantha Francisco MD    12/2/2022 11:15 AM

## 2022-12-02 NOTE — PROGRESS NOTES
Patient requested to leave AMA. Explained risks associated with leaving AMA and process involved. Second nurse to witness. Form signed and on chart. All LDAs removed. Patient left with family on 12/2/2022 at 200.

## 2022-12-03 LAB
BLOOD CULTURE, ROUTINE: NORMAL
CULTURE, BLOOD 2: NORMAL
ESTIMATED AVERAGE GLUCOSE: 119.8 MG/DL
HBA1C MFR BLD: 5.8 %

## 2022-12-04 LAB
INFLUENZA A BY PCR: NOT DETECTED
INFLUENZA B BY PCR: NOT DETECTED
RSV BY PCR: NOT DETECTED
RSV SOURCE: NORMAL

## 2022-12-06 LAB
BLOOD CULTURE, ROUTINE: NORMAL
CULTURE, BLOOD 2: NORMAL

## 2022-12-07 NOTE — PROGRESS NOTES
Physician Progress Note      PATIENT:               Gilberto Yi  CSN #:                  [id number]  :                       1970  ADMIT DATE:       2022 5:48 AM  DISCH DATE:        2022 1:00 PM  RESPONDING  PROVIDER #:        Aaron Babinski MD          QUERY TEXT:    Pt admitted with acute respiratory failure secondary to COPD exacerbation   secondary to possible pna. Noted documentation of sepsis by ED provider. If   possible, please document in progress notes and discharge summary:      The medical record reflects the following:  Risk Factors: 52yo with poss pna and COPD    Clinical Indicators:  - WBC 13.2, Lactic acid 3.4 then 2.5,  4L O2, RR 32  ED, Clinical impression - severe sepsis. ED workup was notable for concern for   criteria for severe sepsis with pneumonia    Treatment: 30mL/kg bolus and started on abx in ED, Pt left AMA  Options provided:  -- Possible sepsis confirmed present on admission  -- Sepsis ruled out  -- Other - I will add my own diagnosis  -- Disagree - Not applicable / Not valid  -- Disagree - Clinically unable to determine / Unknown  -- Refer to Clinical Documentation Reviewer    PROVIDER RESPONSE TEXT:    The diagnosis of possible sepsis was confirmed as present on admission. Query created by:  Geremias Keen on 2022 10:14 AM      Electronically signed by:  Aaron Babinski MD 2022 11:05 AM